# Patient Record
Sex: FEMALE | Race: WHITE | Employment: OTHER | URBAN - NONMETROPOLITAN AREA
[De-identification: names, ages, dates, MRNs, and addresses within clinical notes are randomized per-mention and may not be internally consistent; named-entity substitution may affect disease eponyms.]

---

## 2019-07-16 RX ORDER — LOSARTAN POTASSIUM 100 MG/1
100 TABLET ORAL DAILY
Qty: 90 TABLET | Refills: 1 | Status: SHIPPED | OUTPATIENT
Start: 2019-07-16 | End: 2019-08-29 | Stop reason: SDUPTHER

## 2019-08-29 ENCOUNTER — TELEPHONE (OUTPATIENT)
Dept: FAMILY MEDICINE CLINIC | Age: 84
End: 2019-08-29

## 2019-08-29 ENCOUNTER — HOSPITAL ENCOUNTER (OUTPATIENT)
Age: 84
Discharge: HOME | End: 2019-08-31
Payer: MEDICARE

## 2019-08-29 ENCOUNTER — OFFICE VISIT (OUTPATIENT)
Dept: FAMILY MEDICINE CLINIC | Age: 84
End: 2019-08-29
Payer: MEDICARE

## 2019-08-29 VITALS
SYSTOLIC BLOOD PRESSURE: 138 MMHG | HEIGHT: 62 IN | OXYGEN SATURATION: 97 % | HEART RATE: 90 BPM | WEIGHT: 162 LBS | DIASTOLIC BLOOD PRESSURE: 78 MMHG | BODY MASS INDEX: 29.81 KG/M2

## 2019-08-29 DIAGNOSIS — E78.5 HYPERLIPIDEMIA, UNSPECIFIED HYPERLIPIDEMIA TYPE: ICD-10-CM

## 2019-08-29 DIAGNOSIS — I10 ESSENTIAL HYPERTENSION: ICD-10-CM

## 2019-08-29 DIAGNOSIS — N39.46 MIXED STRESS AND URGE URINARY INCONTINENCE: ICD-10-CM

## 2019-08-29 DIAGNOSIS — C67.9 MALIGNANT NEOPLASM OF URINARY BLADDER, UNSPECIFIED SITE (HCC): ICD-10-CM

## 2019-08-29 DIAGNOSIS — E78.5 HYPERLIPIDEMIA, UNSPECIFIED HYPERLIPIDEMIA TYPE: Primary | ICD-10-CM

## 2019-08-29 LAB
ALBUMIN SERPL-MCNC: 4.1 G/DL (ref 3.5–5.2)
ALP BLD-CCNC: 83 U/L (ref 35–104)
ALT SERPL-CCNC: 14 U/L (ref 0–32)
ANION GAP SERPL CALCULATED.3IONS-SCNC: 11 MMOL/L (ref 7–16)
AST SERPL-CCNC: 22 U/L (ref 0–31)
BACTERIA: ABNORMAL /HPF
BASOPHILS ABSOLUTE: 0.02 E9/L (ref 0–0.2)
BASOPHILS RELATIVE PERCENT: 0.4 % (ref 0–2)
BILIRUB SERPL-MCNC: 0.8 MG/DL (ref 0–1.2)
BILIRUBIN URINE: NEGATIVE
BLOOD, URINE: NEGATIVE
BUN BLDV-MCNC: 16 MG/DL (ref 8–23)
CALCIUM SERPL-MCNC: 9.3 MG/DL (ref 8.6–10.2)
CHLORIDE BLD-SCNC: 105 MMOL/L (ref 98–107)
CHOLESTEROL, TOTAL: 174 MG/DL (ref 0–199)
CLARITY: CLEAR
CO2: 26 MMOL/L (ref 22–29)
COLOR: YELLOW
CREAT SERPL-MCNC: 1 MG/DL (ref 0.5–1)
EOSINOPHILS ABSOLUTE: 0.16 E9/L (ref 0.05–0.5)
EOSINOPHILS RELATIVE PERCENT: 2.9 % (ref 0–6)
EPITHELIAL CELLS, UA: ABNORMAL /HPF
GFR AFRICAN AMERICAN: >60
GFR NON-AFRICAN AMERICAN: 52 ML/MIN/1.73
GLUCOSE BLD-MCNC: 101 MG/DL (ref 74–99)
GLUCOSE URINE: NEGATIVE MG/DL
HCT VFR BLD CALC: 42.4 % (ref 34–48)
HDLC SERPL-MCNC: 45 MG/DL
HEMOGLOBIN: 12.9 G/DL (ref 11.5–15.5)
IMMATURE GRANULOCYTES #: 0.01 E9/L
IMMATURE GRANULOCYTES %: 0.2 % (ref 0–5)
KETONES, URINE: NEGATIVE MG/DL
LDL CHOLESTEROL CALCULATED: 107 MG/DL (ref 0–99)
LEUKOCYTE ESTERASE, URINE: ABNORMAL
LYMPHOCYTES ABSOLUTE: 1.99 E9/L (ref 1.5–4)
LYMPHOCYTES RELATIVE PERCENT: 35.5 % (ref 20–42)
MCH RBC QN AUTO: 27.6 PG (ref 26–35)
MCHC RBC AUTO-ENTMCNC: 30.4 % (ref 32–34.5)
MCV RBC AUTO: 90.6 FL (ref 80–99.9)
MONOCYTES ABSOLUTE: 0.53 E9/L (ref 0.1–0.95)
MONOCYTES RELATIVE PERCENT: 9.5 % (ref 2–12)
NEUTROPHILS ABSOLUTE: 2.89 E9/L (ref 1.8–7.3)
NEUTROPHILS RELATIVE PERCENT: 51.5 % (ref 43–80)
NITRITE, URINE: NEGATIVE
PDW BLD-RTO: 13.8 FL (ref 11.5–15)
PH UA: 5.5 (ref 5–9)
PLATELET # BLD: 209 E9/L (ref 130–450)
PMV BLD AUTO: 11 FL (ref 7–12)
POTASSIUM SERPL-SCNC: 4.2 MMOL/L (ref 3.5–5)
PROTEIN UA: NEGATIVE MG/DL
RBC # BLD: 4.68 E12/L (ref 3.5–5.5)
RBC UA: ABNORMAL /HPF (ref 0–2)
SODIUM BLD-SCNC: 142 MMOL/L (ref 132–146)
SPECIFIC GRAVITY UA: 1.02 (ref 1–1.03)
TOTAL PROTEIN: 7.1 G/DL (ref 6.4–8.3)
TRIGL SERPL-MCNC: 109 MG/DL (ref 0–149)
UROBILINOGEN, URINE: 0.2 E.U./DL
VLDLC SERPL CALC-MCNC: 22 MG/DL
WBC # BLD: 5.6 E9/L (ref 4.5–11.5)
WBC UA: ABNORMAL /HPF (ref 0–5)

## 2019-08-29 PROCEDURE — 36415 COLL VENOUS BLD VENIPUNCTURE: CPT

## 2019-08-29 PROCEDURE — 80053 COMPREHEN METABOLIC PANEL: CPT

## 2019-08-29 PROCEDURE — 99214 OFFICE O/P EST MOD 30 MIN: CPT | Performed by: INTERNAL MEDICINE

## 2019-08-29 PROCEDURE — 80061 LIPID PANEL: CPT

## 2019-08-29 PROCEDURE — 85025 COMPLETE CBC W/AUTO DIFF WBC: CPT

## 2019-08-29 PROCEDURE — 81001 URINALYSIS AUTO W/SCOPE: CPT

## 2019-08-29 RX ORDER — ATORVASTATIN CALCIUM 20 MG/1
TABLET, FILM COATED ORAL
Refills: 1 | COMMUNITY
Start: 2019-08-26 | End: 2019-08-29 | Stop reason: SDUPTHER

## 2019-08-29 RX ORDER — LANOLIN ALCOHOL/MO/W.PET/CERES
CREAM (GRAM) TOPICAL
COMMUNITY

## 2019-08-29 RX ORDER — MAGNESIUM OXIDE 400 MG/1
TABLET ORAL
COMMUNITY
Start: 2010-09-09

## 2019-08-29 RX ORDER — ELECTROLYTES/DEXTROSE
SOLUTION, ORAL ORAL
COMMUNITY
Start: 2010-02-11 | End: 2021-02-11 | Stop reason: ALTCHOICE

## 2019-08-29 RX ORDER — ATORVASTATIN CALCIUM 20 MG/1
TABLET, FILM COATED ORAL
Qty: 45 TABLET | Refills: 1 | Status: SHIPPED | OUTPATIENT
Start: 2019-08-29 | End: 2019-12-18 | Stop reason: SDUPTHER

## 2019-08-29 RX ORDER — LOSARTAN POTASSIUM 100 MG/1
100 TABLET ORAL DAILY
Qty: 90 TABLET | Refills: 1 | Status: SHIPPED | OUTPATIENT
Start: 2019-08-29 | End: 2020-01-15 | Stop reason: SDUPTHER

## 2019-08-29 RX ORDER — CHLORAL HYDRATE 500 MG
CAPSULE ORAL
COMMUNITY
Start: 2010-02-11 | End: 2020-07-27 | Stop reason: ALTCHOICE

## 2019-08-29 ASSESSMENT — PATIENT HEALTH QUESTIONNAIRE - PHQ9
SUM OF ALL RESPONSES TO PHQ QUESTIONS 1-9: 0
1. LITTLE INTEREST OR PLEASURE IN DOING THINGS: 0
SUM OF ALL RESPONSES TO PHQ9 QUESTIONS 1 & 2: 0
SUM OF ALL RESPONSES TO PHQ QUESTIONS 1-9: 0
2. FEELING DOWN, DEPRESSED OR HOPELESS: 0

## 2019-09-04 ENCOUNTER — OFFICE VISIT (OUTPATIENT)
Dept: FAMILY MEDICINE CLINIC | Age: 84
End: 2019-09-04
Payer: MEDICARE

## 2019-09-04 VITALS
HEIGHT: 63 IN | WEIGHT: 161 LBS | DIASTOLIC BLOOD PRESSURE: 70 MMHG | OXYGEN SATURATION: 96 % | SYSTOLIC BLOOD PRESSURE: 132 MMHG | TEMPERATURE: 98.9 F | BODY MASS INDEX: 28.53 KG/M2 | HEART RATE: 97 BPM

## 2019-09-04 DIAGNOSIS — J40 BRONCHITIS: Primary | ICD-10-CM

## 2019-09-04 PROCEDURE — 99213 OFFICE O/P EST LOW 20 MIN: CPT | Performed by: PHYSICIAN ASSISTANT

## 2019-09-04 RX ORDER — CEFDINIR 300 MG/1
300 CAPSULE ORAL 2 TIMES DAILY
Qty: 20 CAPSULE | Refills: 0 | Status: SHIPPED | OUTPATIENT
Start: 2019-09-04 | End: 2019-09-14

## 2019-09-04 NOTE — PROGRESS NOTES
2019   [facility] [address]  [facility] [id number]  [phone]    Lukas De Paz  : 1932  Age: 80 y.o. Sex: female      Subjective:  Chief Complaint   Patient presents with    Cough    Congestion       HPI: The patient states cough and congestion that started 3 days ago. Cough is productive of discolored sputum. Denies any difficulty breathing. Admits to slight headache. Pt has not been taking OTC medications. Denies fever, chills. Denies chest pain or shortness of breath. No vomiting or diarrhea. Eating and drinking without difficulty. The patient presents for evaluation. ROS:  Positive and pertinent negatives as per HPI. All other systems are reviewed and negative. Current Outpatient Medications:     cefdinir (OMNICEF) 300 MG capsule, Take 1 capsule by mouth 2 times daily for 10 days, Disp: 20 capsule, Rfl: 0    vitamin D (CHOLECALCIFEROL) 1000 UNIT TABS tablet, Take by mouth, Disp: , Rfl:     vitamin B-12 (CYANOCOBALAMIN) 1000 MCG tablet, Take by mouth, Disp: , Rfl:     Omega-3 Fatty Acids (FISH OIL) 1000 MG CAPS, Take by mouth, Disp: , Rfl:     Multiple Vitamins-Minerals (MULTIVITAMIN ADULT) TABS, Take by mouth, Disp: , Rfl:     magnesium oxide (MAG-OX) 400 MG tablet, Take by mouth, Disp: , Rfl:     losartan (COZAAR) 100 MG tablet, Take 1 tablet by mouth daily, Disp: 90 tablet, Rfl: 1    atorvastatin (LIPITOR) 20 MG tablet, TAKE 1 TABLET BY MOUTH EVERY OTHER DAY, Disp: 45 tablet, Rfl: 1   Allergies   Allergen Reactions    Acetaminophen     Oxycodone-Acetaminophen Itching        Objective:  Vitals:    19 1143   BP: 132/70   Pulse: 97   Temp: 98.9 °F (37.2 °C)   TempSrc: Temporal   SpO2: 96%   Weight: 161 lb (73 kg)   Height: 5' 3\" (1.6 m)        Exam:  Const: Appears healthy and well developed. No signs of acute distress present. Vitals reviewed per triage. Head/Face: Normocephalic, atraumatic. Facies is symmetric. Eyes: PERRL.   ENMT: Tympanic

## 2019-12-17 DIAGNOSIS — E78.5 HYPERLIPIDEMIA, UNSPECIFIED HYPERLIPIDEMIA TYPE: ICD-10-CM

## 2019-12-18 RX ORDER — ATORVASTATIN CALCIUM 20 MG/1
TABLET, FILM COATED ORAL
Qty: 45 TABLET | Refills: 1 | Status: SHIPPED | OUTPATIENT
Start: 2019-12-18 | End: 2020-01-15 | Stop reason: SDUPTHER

## 2020-01-15 ENCOUNTER — OFFICE VISIT (OUTPATIENT)
Dept: FAMILY MEDICINE CLINIC | Age: 85
End: 2020-01-15
Payer: MEDICARE

## 2020-01-15 VITALS
WEIGHT: 161 LBS | OXYGEN SATURATION: 90 % | HEART RATE: 87 BPM | DIASTOLIC BLOOD PRESSURE: 78 MMHG | BODY MASS INDEX: 28.53 KG/M2 | RESPIRATION RATE: 18 BRPM | SYSTOLIC BLOOD PRESSURE: 138 MMHG | TEMPERATURE: 98.7 F | HEIGHT: 63 IN

## 2020-01-15 PROCEDURE — 99214 OFFICE O/P EST MOD 30 MIN: CPT | Performed by: INTERNAL MEDICINE

## 2020-01-15 RX ORDER — ATORVASTATIN CALCIUM 20 MG/1
TABLET, FILM COATED ORAL
Qty: 45 TABLET | Refills: 1 | Status: SHIPPED
Start: 2020-01-15 | End: 2020-07-27 | Stop reason: SDUPTHER

## 2020-01-15 RX ORDER — AMLODIPINE BESYLATE 5 MG/1
5 TABLET ORAL DAILY
Qty: 90 TABLET | Refills: 1 | Status: SHIPPED
Start: 2020-01-15 | End: 2020-07-27 | Stop reason: SDUPTHER

## 2020-01-15 RX ORDER — LOSARTAN POTASSIUM 100 MG/1
100 TABLET ORAL DAILY
Qty: 90 TABLET | Refills: 1 | Status: SHIPPED
Start: 2020-01-15 | End: 2020-07-27 | Stop reason: SDUPTHER

## 2020-01-15 ASSESSMENT — PATIENT HEALTH QUESTIONNAIRE - PHQ9
SUM OF ALL RESPONSES TO PHQ QUESTIONS 1-9: 0
SUM OF ALL RESPONSES TO PHQ QUESTIONS 1-9: 0
1. LITTLE INTEREST OR PLEASURE IN DOING THINGS: 0
2. FEELING DOWN, DEPRESSED OR HOPELESS: 0
SUM OF ALL RESPONSES TO PHQ9 QUESTIONS 1 & 2: 0

## 2020-01-16 NOTE — PROGRESS NOTES
[address] [facility]     1/15/20  Polo Glass : 1932 Sex: female  Age: 80 y.o. Chief Complaint   Patient presents with    3 Month Follow-Up       HPI    Patient presents today for 3-month follow-up visit on her medical problems. She brings in blood pressure numbers today but only a few. They all look high. Couple of them in the 596 systolic range. Nothing less than 137. I told her going to have to add something to her losartan as this is very poorly controlled still. Does have upcoming Botox injection through urology for her incontinence at the end of the month. Follows with them and ophthalmology. She is tolerating her medications okay. Lipids have been stable. Also her bladder cancer also with urology. Review of Systems     Const: Denies chills, fever and sweats. ENMT: Denies ear symptoms. Reports postnasal drip, but denies other nasal symptoms. Denies mouth or throat  symptoms. CV: Denies chest pain, orthopnea and palpitations. Resp: Denies cough, SOB and wheezing. GI: Denies diarrhea, nausea and vomiting. : Urinary: reports incontinence, but denies dysuria, frequency and frequent UTI's--she has not been following with  urology Recently. She has had InterStim placement and Botox treatment in the past. She also has history of bladder  cancer. Musculo: Reports arthritis. Neuro: Reports difficulty with balance but denies dizziness, headache, seizures and syncope/Improved/no longer  following with neurology  Psych: Reports anxiety, depression and stress-stable-wishing no further medication. REST OF PERTINENT ROS GONE OVER AND WAS NEGATIVE.    PMH:  Problem List: Essential hypertension, Benign essential hypertension, Taking medication, Hypothyroidism  Health Maintenance:  Couseled on Home Safety - (2018)  Mammogram - (2011)  Mammogram Screening - (2007)  Colonoscopy - (2010)  Bone Density Test Screening - (2008)  Pelvic/Pap Exam - with  Cambi  Rectal Exam - 5/10  Bone Density Scan - ,,,-nl  Duplex Carotid Ultasound - ,3/09  2D ECHO -   EKG - ,,,10/14,3/16,  Hemmocult Cards - 7/15-neg x 3  Holter monitor - 3/16  Prevnar Vaccine - (2015)  Pneumonia Vaccination - ()  Tetanus Immunization - ()  Zoster/Shingles Vaccine - () [facility]  Influenza Vaccination - (2016)  Medical Problems:  Hypertension - . Hypothyroidism - . Incontinence - .bladder interstim/botox  transient ischemic attack - (2007) . Abdominal Hernia Left - followed by Dr. Yony Salgado  Dysphagia - . Tinnitus - .  Varicose Veins - tx with sclerotherapy  Arthritis - shoulders; injected by Dr Sorto Shoulder - follows with urology  reflex sympathetic dystrophy  Pulmonary Nodule - followed and stable  Sleep Apnea  Hyperlipidemia - statin intolerance  Anxiety, Depression, Osteoarthritis, LLQ spigelian hernia repair, Hiatal Hernia, Lumbar DDD, Lumbar Spinal Stenosis,  Diverticulosis  Valvular Heart Disease - mild  InterStim placement - 3/16  Macular Degeneration  Left carpal tunnel surgery -   Surgical Hx:  Bladder Repair - X4  Ovarian Cyst - . Oophorectomy- - . Hysterectomy, Partial - () . Tonsillectomy W/ Adenoidectomy - () (age 10 Years) . Endoscopy - (2006) .   Cataract Removal - bilateral  carpal tunnel release - remotely, right hand  carpal tunnel release, left - (2018) Dr. Cardona Bath: Sheba Kelly, for reading  OB/Gyn Hx:: (3)Parity: Full term (3)  Reviewed, no changes.             Current Outpatient Medications:     Turmeric (CURCUMIN 95 PO), Take by mouth, Disp: , Rfl:     atorvastatin (LIPITOR) 20 MG tablet, TAKE 1 TABLET BY MOUTH EVERY OTHER DAY, Disp: 45 tablet, Rfl: 1    losartan (COZAAR) 100 MG tablet, Take 1 tablet by mouth daily, Disp: 90 tablet, Rfl: 1    vitamin D (CHOLECALCIFEROL) 1000 UNIT TABS tablet, Take by mouth, Disp: , Rfl:     vitamin B-12 (CYANOCOBALAMIN) 1000 MCG tablet, Take by mouth, Disp: , Rfl:     Omega-3 Fatty Acids (FISH OIL) 1000 MG CAPS, Take by mouth, Disp: , Rfl:     Multiple Vitamins-Minerals (MULTIVITAMIN ADULT) TABS, Take by mouth, Disp: , Rfl:     magnesium oxide (MAG-OX) 400 MG tablet, Take by mouth, Disp: , Rfl:   No Known Allergies    Past Medical History:   Diagnosis Date    Abdominal hernia     SANKOVIC    Anxiety     Arthritis     shoulders; injected by dr Marylene Ruddy    Bladder [facility])     DDD (degenerative disc disease), cervical     lumbar    Depression     Diverticulosis     Dysphagia     Hiatal hernia     Hyperlipidemia     Hypertension     Hypothyroidism     Incontinence     Macular degeneration     Pulmonary nodule     Reflex sympathetic dystrophy     Sleep apnea     Spigelian hernia     Spinal stenosis     lumbar    Tinnitus     Transient ischemic attack 01/2007    Valvular heart disease      Past Surgical History:   Procedure Laterality Date    BLADDER REPAIR      x4    CARPAL TUNNEL RELEASE Right     CARPAL TUNNEL RELEASE Left 02/2018    kylah    CATARACT REMOVAL Bilateral     OVARY REMOVAL      PARTIAL HYSTERECTOMY  1977    TONSILLECTOMY  1941     Family History   Problem Relation Age of Onset    Stroke Mother     Prostate Cancer Father     Lung Cancer Sister     Prostate Cancer Brother     Coronary Art Dis Brother     Pancreatic Cancer Brother     Breast Cancer Maternal Aunt     Cancer Paternal Aunt         ovarian, stomach    Cancer Paternal Uncle     Stroke Maternal Grandmother     Other Son         neuroendocine malignancy    Other Daughter         neuroendocine malignancy     Social History     Socioeconomic History    Marital status:      Spouse name: Not on file    Number of children: Not on file    Years of education: Not on file    Highest education level: Not on file   Occupational History    Not on file   Social Needs    Financial resource strain: Not on file   Viki-Vicky insecurity:     Worry: Not on file     Inability: Not on file    Transportation needs:     Medical: Not on file     Non-medical: Not on file   Tobacco Use    Smoking status: Never Smoker    Smokeless tobacco: Never Used   Substance and Sexual Activity    Alcohol use: Yes     Comment: occ    Drug use: Not on file    Sexual activity: Not on file   Lifestyle    Physical activity:     Days per week: Not on file     Minutes per session: Not on file    Stress: Not on file   Relationships    Social connections:     Talks on phone: Not on file     Gets together: Not on file     Attends Jehovah's witness service: Not on file     Active member of club or organization: Not on file     Attends meetings of clubs or organizations: Not on file     Relationship status: Not on file    Intimate partner violence:     Fear of current or ex partner: Not on file     Emotionally abused: Not on file     Physically abused: Not on file     Forced sexual activity: Not on file   Other Topics Concern    Not on file   Social History Narrative    Not on file       Vitals:    01/15/20 1525 01/15/20 1531   BP: (!) 142/78 138/78   Pulse: 87    Resp: 18    Temp: 98.7 °F (37.1 °C)    TempSrc: Temporal    SpO2: 90%    Weight: 161 lb (73 kg)    Height: 5' 3\" (1.6 m)        Physical Exam    Const: Appears well developed and well nourished. No signs of acute distress present. Neck: Supple and symmetric. Palpation reveals no adenopathy. No masses appreciated. Thyroid exhibits no nodule  or thyromegaly. No JVD. Carotids: 2+ and equal bilaterally, without bruits. Resp: No rales, rhonchi or wheezes appreciated over the lungs bilaterally. CV: Rate is regular. Rhythm is regular/occasional ectopy. S1 is normal. S2 is normal. No gallop or rubs. No heart  murmur appreciated. Extremities: Edema, but no clubbing or cyanosis/trace  Abdomen: Bowel sounds are normoactive. Palpation reveals softness, with no distension, organomegaly or  tenderness.  No abdominal masses palpable. No palpable hepatosplenomegaly. Musculo: Walks with a normal gait I don't see any ataxia at this time. Upper Extremities: Limitation with movement of  the upper extremities bilaterally. Lower Extremities: Full ROM bilaterally. Psych: Patient's attitude is cooperative. Patient's affect is appropriate. Judgement is realistic. Insight is appropriate. Neurologically grossly intact without focal deficits noted. Assessment and Plan:  Yoly Martin was seen today for 3 month follow-up. Diagnoses and all orders for this visit:    Mixed stress and urge urinary incontinence    Hyperlipidemia, unspecified hyperlipidemia type  -     atorvastatin (LIPITOR) 20 MG tablet; TAKE 1 TABLET BY MOUTH EVERY OTHER DAY  -     Lipid Panel; Future    Essential hypertension  -     losartan (COZAAR) 100 MG tablet; Take 1 tablet by mouth daily  -     CBC Auto Differential; Future  -     Comprehensive Metabolic Panel; Future  -     MAGNESIUM; Future    Malignant neoplasm of urinary bladder, unspecified site [facility])      Plan: Blood work to monitor disease progression and medication use. This will be accomplished in 2 weeks. Follow-up with urology and ophthalmology. Amlodipine 5 mg to her antihypertensive regimen. Monitor blood pressure closely. Call in numbers in 2 to 3 weeks with an update. Other orders will be pending above. I will see her back in 3 months and as needed. Return in about 3 months (around 4/15/2020). Seen By:  Madi Mcnamara MD      *Document was created using voice recognition software. Note was reviewed however may contain grammatical errors.

## 2020-01-23 ENCOUNTER — TELEPHONE (OUTPATIENT)
Dept: FAMILY MEDICINE CLINIC | Age: 85
End: 2020-01-23

## 2020-01-23 ENCOUNTER — HOSPITAL ENCOUNTER (OUTPATIENT)
Age: 85
Discharge: HOME | End: 2020-01-25
Payer: MEDICARE

## 2020-01-23 LAB
ALBUMIN SERPL-MCNC: 4.2 G/DL (ref 3.5–5.2)
ALP BLD-CCNC: 74 U/L (ref 35–104)
ALT SERPL-CCNC: 13 U/L (ref 0–32)
ANION GAP SERPL CALCULATED.3IONS-SCNC: 12 MMOL/L (ref 7–16)
AST SERPL-CCNC: 21 U/L (ref 0–31)
BASOPHILS ABSOLUTE: 0.04 E9/L (ref 0–0.2)
BASOPHILS RELATIVE PERCENT: 0.6 % (ref 0–2)
BILIRUB SERPL-MCNC: 0.9 MG/DL (ref 0–1.2)
BUN BLDV-MCNC: 20 MG/DL (ref 8–23)
CALCIUM SERPL-MCNC: 9.2 MG/DL (ref 8.6–10.2)
CHLORIDE BLD-SCNC: 102 MMOL/L (ref 98–107)
CHOLESTEROL, TOTAL: 133 MG/DL (ref 0–199)
CO2: 25 MMOL/L (ref 22–29)
CREAT SERPL-MCNC: 1 MG/DL (ref 0.5–1)
EOSINOPHILS ABSOLUTE: 0.14 E9/L (ref 0.05–0.5)
EOSINOPHILS RELATIVE PERCENT: 2.1 % (ref 0–6)
GFR AFRICAN AMERICAN: >60
GFR NON-AFRICAN AMERICAN: 52 ML/MIN/1.73
GLUCOSE BLD-MCNC: 124 MG/DL (ref 74–99)
HCT VFR BLD CALC: 42.9 % (ref 34–48)
HDLC SERPL-MCNC: 48 MG/DL
HEMOGLOBIN: 13.1 G/DL (ref 11.5–15.5)
IMMATURE GRANULOCYTES #: 0.03 E9/L
IMMATURE GRANULOCYTES %: 0.5 % (ref 0–5)
LDL CHOLESTEROL CALCULATED: 65 MG/DL (ref 0–99)
LYMPHOCYTES ABSOLUTE: 1.83 E9/L (ref 1.5–4)
LYMPHOCYTES RELATIVE PERCENT: 27.7 % (ref 20–42)
MAGNESIUM: 2.3 MG/DL (ref 1.6–2.6)
MCH RBC QN AUTO: 27.4 PG (ref 26–35)
MCHC RBC AUTO-ENTMCNC: 30.5 % (ref 32–34.5)
MCV RBC AUTO: 89.7 FL (ref 80–99.9)
MONOCYTES ABSOLUTE: 0.63 E9/L (ref 0.1–0.95)
MONOCYTES RELATIVE PERCENT: 9.5 % (ref 2–12)
NEUTROPHILS ABSOLUTE: 3.94 E9/L (ref 1.8–7.3)
NEUTROPHILS RELATIVE PERCENT: 59.6 % (ref 43–80)
PDW BLD-RTO: 13.4 FL (ref 11.5–15)
PLATELET # BLD: 242 E9/L (ref 130–450)
PMV BLD AUTO: 11.4 FL (ref 7–12)
POTASSIUM SERPL-SCNC: 4.9 MMOL/L (ref 3.5–5)
RBC # BLD: 4.78 E12/L (ref 3.5–5.5)
SODIUM BLD-SCNC: 139 MMOL/L (ref 132–146)
TOTAL PROTEIN: 6.8 G/DL (ref 6.4–8.3)
TRIGL SERPL-MCNC: 101 MG/DL (ref 0–149)
VLDLC SERPL CALC-MCNC: 20 MG/DL
WBC # BLD: 6.6 E9/L (ref 4.5–11.5)

## 2020-01-23 PROCEDURE — 80061 LIPID PANEL: CPT

## 2020-01-23 PROCEDURE — 80053 COMPREHEN METABOLIC PANEL: CPT

## 2020-01-23 PROCEDURE — 36415 COLL VENOUS BLD VENIPUNCTURE: CPT

## 2020-01-23 PROCEDURE — 85025 COMPLETE CBC W/AUTO DIFF WBC: CPT

## 2020-01-23 PROCEDURE — 83735 ASSAY OF MAGNESIUM: CPT

## 2020-04-20 ENCOUNTER — VIRTUAL VISIT (OUTPATIENT)
Dept: PRIMARY CARE CLINIC | Age: 85
End: 2020-04-20
Payer: MEDICARE

## 2020-04-20 VITALS
BODY MASS INDEX: 28.16 KG/M2 | HEIGHT: 62 IN | SYSTOLIC BLOOD PRESSURE: 133 MMHG | DIASTOLIC BLOOD PRESSURE: 74 MMHG | WEIGHT: 153 LBS

## 2020-04-20 PROCEDURE — G2012 BRIEF CHECK IN BY MD/QHP: HCPCS | Performed by: INTERNAL MEDICINE

## 2020-04-20 NOTE — PROGRESS NOTES
stenosis     lumbar    Tinnitus     Transient ischemic attack 01/2007    Valvular heart disease      Past Surgical History:   Procedure Laterality Date    BLADDER REPAIR      x4    CARPAL TUNNEL RELEASE Right     CARPAL TUNNEL RELEASE Left 02/2018    kylah    CATARACT REMOVAL Bilateral     OVARY REMOVAL      PARTIAL HYSTERECTOMY  1977    TONSILLECTOMY  1941     Family History   Problem Relation Age of Onset    Stroke Mother     Prostate Cancer Father     Lung Cancer Sister     Prostate Cancer Brother     Coronary Art Dis Brother     Pancreatic Cancer Brother     Breast Cancer Maternal Aunt     Cancer Paternal Aunt         ovarian, stomach    Cancer Paternal Uncle     Stroke Maternal Grandmother     Other Son         neuroendocine malignancy    Other Daughter         neuroendocine malignancy     Social History     Socioeconomic History    Marital status:      Spouse name: Not on file    Number of children: Not on file    Years of education: Not on file    Highest education level: Not on file   Occupational History    Not on file   Social Needs    Financial resource strain: Not on file    Food insecurity     Worry: Not on file     Inability: Not on file    Transportation needs     Medical: Not on file     Non-medical: Not on file   Tobacco Use    Smoking status: Never Smoker    Smokeless tobacco: Never Used   Substance and Sexual Activity    Alcohol use: Yes     Comment: occ    Drug use: Not on file    Sexual activity: Not on file   Lifestyle    Physical activity     Days per week: Not on file     Minutes per session: Not on file    Stress: Not on file   Relationships    Social connections     Talks on phone: Not on file     Gets together: Not on file     Attends Zoroastrianism service: Not on file     Active member of club or organization: Not on file     Attends meetings of clubs or organizations: Not on file     Relationship status: Not on file    Intimate partner violence

## 2020-07-27 ENCOUNTER — OFFICE VISIT (OUTPATIENT)
Dept: FAMILY MEDICINE CLINIC | Age: 85
End: 2020-07-27
Payer: MEDICARE

## 2020-07-27 VITALS
BODY MASS INDEX: 27.86 KG/M2 | HEART RATE: 88 BPM | HEIGHT: 62 IN | SYSTOLIC BLOOD PRESSURE: 124 MMHG | DIASTOLIC BLOOD PRESSURE: 82 MMHG | WEIGHT: 151.4 LBS | OXYGEN SATURATION: 98 % | TEMPERATURE: 97.8 F

## 2020-07-27 PROCEDURE — 99214 OFFICE O/P EST MOD 30 MIN: CPT | Performed by: INTERNAL MEDICINE

## 2020-07-27 RX ORDER — LOSARTAN POTASSIUM 100 MG/1
100 TABLET ORAL DAILY
Qty: 90 TABLET | Refills: 1 | Status: SHIPPED
Start: 2020-07-27 | End: 2020-11-11 | Stop reason: SDUPTHER

## 2020-07-27 RX ORDER — ATORVASTATIN CALCIUM 20 MG/1
TABLET, FILM COATED ORAL
Qty: 45 TABLET | Refills: 1 | Status: SHIPPED
Start: 2020-07-27 | End: 2020-11-11 | Stop reason: SDUPTHER

## 2020-07-27 RX ORDER — AMLODIPINE BESYLATE 5 MG/1
5 TABLET ORAL DAILY
Qty: 90 TABLET | Refills: 1 | Status: SHIPPED
Start: 2020-07-27 | End: 2020-11-11 | Stop reason: SDUPTHER

## 2020-07-27 NOTE — PROGRESS NOTES
[address] [facility] [address] [facility]     20  Pako Martinez : 1932 Sex: female  Age: 80 y.o. Chief Complaint   Patient presents with    Hyperlipidemia       HPI    Patient presents today for 3-month follow-up visit on her medical problems. Patient states she has not been checking blood pressure very often but when she does check it is been in good range. Number today on recheck was okay. I did ask her to start checking at least once or twice a week and documenting. Her lipids have been in a good range on her statin medication. She is tolerating this well. She is receiving Botox from urology for her urinary symptoms. She is to see them upcoming again soon. She denies any falls. States she been somewhat fatigued recently as she and her  are in the process of moving. States she does not sleep very well. We did discuss melatonin as an option patient states that she has lost about 10 pounds with the process of his recent move. States her appetite has been good. .  She does continue to follow with urology for her bladder cancer and Botox injections for urinary incontinence. Also follows with ophthalmology for her macular degeneration. Review of Systems     Const: Denies chills, fever and sweats. ENMT: Denies ear symptoms. Reports postnasal drip, but denies other nasal symptoms. Denies mouth or throat  symptoms. CV: Denies chest pain, orthopnea and palpitations. Resp: Denies cough, SOB and wheezing. GI: Denies diarrhea, nausea and vomiting. : Urinary: reports incontinence, but denies dysuria, frequency and frequent UTI's--he is following with urology. She has had InterStim placement and Botox treatment in the past. She also has history of bladder  cancer. Musculo: Reports arthritis. Neuro: Reports difficulty with balance but denies dizziness, headache, seizures and syncope/Improved/no longer  following with neurology-symptoms have improved.   Psych: Reports anxiety, depression and stress-stable-wishing no further medication.          REST OF PERTINENT ROS GONE OVER AND WAS NEGATIVE. PMH:  Problem List: Essential hypertension, Benign essential hypertension, Taking medication, Hypothyroidism  Health Maintenance:  Couseled on Home Safety - (2018)  Mammogram - (2011)  Mammogram Screening - (2007)  Colonoscopy - (2010)  Bone Density Test Screening - (2008)  Pelvic/Pap Exam - with Dr. Daisey Mortimer  Rectal Exam - 5/10  Bone Density Scan - ,,,-nl  Duplex Carotid Ultasound - ,3/09  2D ECHO -   EKG - ,,,10/14,3/16,  Hemmocult Cards - 7/15-neg x 3  Holter monitor - 3/16  Prevnar Vaccine - (2015)  Pneumonia Vaccination - ()  Tetanus Immunization - ()  Zoster/Shingles Vaccine - () [facility]  Influenza Vaccination - (2016)  Medical Problems:  Hypertension - . Hypothyroidism - . Incontinence - .bladder interstim/botox  transient ischemic attack - (2007) . Abdominal Hernia Left - followed by Dr. Andie Alford  Dysphagia - . Tinnitus - .  Varicose Veins - tx with sclerotherapy  Arthritis - shoulders; injected by Dr Nasir Gonzales - follows with urology  reflex sympathetic dystrophy  Pulmonary Nodule - followed and stable  Sleep Apnea  Hyperlipidemia - statin intolerance  Anxiety, Depression, Osteoarthritis, LLQ spigelian hernia repair, Hiatal Hernia, Lumbar DDD, Lumbar Spinal Stenosis,  Diverticulosis  Valvular Heart Disease - mild  InterStim placement - 3/16  Macular Degeneration  Left carpal tunnel surgery -   Surgical Hx:  Bladder Repair - X4  Ovarian Cyst - . Oophorectomy- - . Hysterectomy, Partial - () . Tonsillectomy W/ Adenoidectomy - (1941) (age 10 Years) . Endoscopy - (2006) .   Cataract Removal - bilateral  carpal tunnel release - remotely, right hand  carpal tunnel release, left - (2018) Dr. Boris Olvera: Ezio Young, for reading  OB/Gyn Hx:: (3)Parity: Full term (3)  Reviewed, no changes.             Current Outpatient Medications:     amLODIPine (NORVASC) 5 MG tablet, Take 1 tablet by mouth daily, Disp: 90 tablet, Rfl: 1    atorvastatin (LIPITOR) 20 MG tablet, TAKE 1 TABLET BY MOUTH EVERY OTHER DAY, Disp: 45 tablet, Rfl: 1    losartan (COZAAR) 100 MG tablet, Take 1 tablet by mouth daily, Disp: 90 tablet, Rfl: 1    Turmeric (CURCUMIN 95 PO), Take by mouth, Disp: , Rfl:     vitamin D (CHOLECALCIFEROL) 1000 UNIT TABS tablet, Take by mouth, Disp: , Rfl:     vitamin B-12 (CYANOCOBALAMIN) 1000 MCG tablet, Take by mouth, Disp: , Rfl:     Multiple Vitamins-Minerals (MULTIVITAMIN ADULT) TABS, Take by mouth, Disp: , Rfl:     magnesium oxide (MAG-OX) 400 MG tablet, Take by mouth, Disp: , Rfl:   No Known Allergies    Past Medical History:   Diagnosis Date    Abdominal hernia     SANKOVIC    Anxiety     Arthritis     shoulders; injected by dr Lara Mazariegos    Bladder cancer [facility])     DDD (degenerative disc disease), cervical     lumbar    Depression     Diverticulosis     Dysphagia     Hiatal hernia     Hyperlipidemia     Hypertension     Hypothyroidism     Incontinence     Macular degeneration     Pulmonary nodule     Reflex sympathetic dystrophy     Sleep apnea     Spigelian hernia     Spinal stenosis     lumbar    Tinnitus     Transient ischemic attack 01/2007    Valvular heart disease      Past Surgical History:   Procedure Laterality Date    BLADDER REPAIR      x4    CARPAL TUNNEL RELEASE Right     CARPAL TUNNEL RELEASE Left 02/2018    kylah    CATARACT REMOVAL Bilateral     OVARY REMOVAL      PARTIAL HYSTERECTOMY  1977    TONSILLECTOMY  1941     Family History   Problem Relation Age of Onset    Stroke Mother     Prostate Cancer Father     Lung Cancer Sister     Prostate Cancer Brother     Coronary Art Dis Brother     Pancreatic Cancer Brother     Breast Cancer Maternal Aunt     Cancer Paternal Aunt         ovarian, stomach    Cancer Paternal Ayala Paula Stroke Maternal Grandmother     Other Son         neuroendocine malignancy    Other Daughter         neuroendocine malignancy     Social History     Socioeconomic History    Marital status:      Spouse name: Not on file    Number of children: Not on file    Years of education: Not on file    Highest education level: Not on file   Occupational History    Not on file   Social Needs    Financial resource strain: Not on file    Food insecurity     Worry: Not on file     Inability: Not on file    Transportation needs     Medical: Not on file     Non-medical: Not on file   Tobacco Use    Smoking status: Never Smoker    Smokeless tobacco: Never Used   Substance and Sexual Activity    Alcohol use: Yes     Comment: occ    Drug use: Not on file    Sexual activity: Not on file   Lifestyle    Physical activity     Days per week: Not on file     Minutes per session: Not on file    Stress: Not on file   Relationships    Social connections     Talks on phone: Not on file     Gets together: Not on file     Attends Voodoo service: Not on file     Active member of club or organization: Not on file     Attends meetings of clubs or organizations: Not on file     Relationship status: Not on file    Intimate partner violence     Fear of current or ex partner: Not on file     Emotionally abused: Not on file     Physically abused: Not on file     Forced sexual activity: Not on file   Other Topics Concern    Not on file   Social History Narrative    Not on file       Vitals:    07/27/20 1003   BP: 124/82   Pulse: 88   Temp: 97.8 °F (36.6 °C)   TempSrc: Temporal   SpO2: 98%   Weight: 151 lb 6.4 oz (68.7 kg)   Height: 5' 2\" (1.575 m)       Physical Exam    Const: Appears well developed and well nourished. No signs of acute distress present. Neck: Supple and symmetric. Palpation reveals no adenopathy. No masses appreciated. Thyroid exhibits no nodule  or thyromegaly. No JVD.  Carotids: 2+ and equal bilaterally, without bruits. Resp: No rales, rhonchi or wheezes appreciated over the lungs bilaterally. CV: Rate is regular. Rhythm is regular/occasional ectopy. S1 is normal. S2 is normal. No gallop or rubs. No heart  murmur appreciated. Extremities: Edema, but no clubbing or cyanosis/trace  Abdomen: Bowel sounds are normoactive. Palpation reveals softness, with no distension, organomegaly or  tenderness. No abdominal masses palpable. No palpable hepatosplenomegaly. Musculo: Walks with a normal gait I don't see any ataxia at this time. Upper Extremities: Limitation with movement of  the upper extremities bilaterally. Lower Extremities: Full ROM bilaterally. Psych: Patient's attitude is cooperative. Patient's affect is appropriate. Judgement is realistic. Insight is appropriate. Neurologically grossly intact without focal deficits noted.           Assessment and Plan:  Cherry Brown was seen today for hyperlipidemia. Diagnoses and all orders for this visit:    Essential hypertension  -     losartan (COZAAR) 100 MG tablet; Take 1 tablet by mouth daily  -     CBC Auto Differential; Future  -     Comprehensive Metabolic Panel; Future  -     MAGNESIUM; Future  Stable on current medication. 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 Other orders  -     amLODIPine (NORVASC) 5 MG tablet; Take 1 tablet by mouth daily    Depression/anxiety: Stable    Plan: Start monitoring blood pressure more closely. Follow-up with above consultants/urology and ophthalmology. Monitor weight for any further loss. Prescription management performed.   Blood work fasting in 2 weeks to monitor disease progression medication use. Patient is declining any medication for depression or anxiety at this time. No suicidal ideation. No follow-ups on file. Seen By:  Adele Maravilla MD      *Document was created using voice recognition software. Note was reviewed however may contain grammatical errors.

## 2020-07-30 ENCOUNTER — TELEPHONE (OUTPATIENT)
Dept: FAMILY MEDICINE CLINIC | Age: 85
End: 2020-07-30

## 2020-07-30 ENCOUNTER — HOSPITAL ENCOUNTER (OUTPATIENT)
Age: 85
Discharge: HOME | End: 2020-08-01
Payer: MEDICARE

## 2020-07-30 LAB
ALBUMIN SERPL-MCNC: 4 G/DL (ref 3.5–5.2)
ALP BLD-CCNC: 75 U/L (ref 35–104)
ALT SERPL-CCNC: 19 U/L (ref 0–32)
ANION GAP SERPL CALCULATED.3IONS-SCNC: 17 MMOL/L (ref 7–16)
AST SERPL-CCNC: 23 U/L (ref 0–31)
BASOPHILS ABSOLUTE: 0.03 E9/L (ref 0–0.2)
BASOPHILS RELATIVE PERCENT: 0.5 % (ref 0–2)
BILIRUB SERPL-MCNC: 0.7 MG/DL (ref 0–1.2)
BUN BLDV-MCNC: 20 MG/DL (ref 8–23)
CALCIUM SERPL-MCNC: 9.1 MG/DL (ref 8.6–10.2)
CHLORIDE BLD-SCNC: 105 MMOL/L (ref 98–107)
CHOLESTEROL, TOTAL: 161 MG/DL (ref 0–199)
CO2: 23 MMOL/L (ref 22–29)
CREAT SERPL-MCNC: 0.9 MG/DL (ref 0.5–1)
EOSINOPHILS ABSOLUTE: 0.1 E9/L (ref 0.05–0.5)
EOSINOPHILS RELATIVE PERCENT: 1.6 % (ref 0–6)
GFR AFRICAN AMERICAN: >60
GFR NON-AFRICAN AMERICAN: 59 ML/MIN/1.73
GLUCOSE BLD-MCNC: 116 MG/DL (ref 74–99)
HCT VFR BLD CALC: 40.1 % (ref 34–48)
HDLC SERPL-MCNC: 46 MG/DL
HEMOGLOBIN: 12.5 G/DL (ref 11.5–15.5)
IMMATURE GRANULOCYTES #: 0.02 E9/L
IMMATURE GRANULOCYTES %: 0.3 % (ref 0–5)
LDL CHOLESTEROL CALCULATED: 90 MG/DL (ref 0–99)
LYMPHOCYTES ABSOLUTE: 1.49 E9/L (ref 1.5–4)
LYMPHOCYTES RELATIVE PERCENT: 23.5 % (ref 20–42)
MAGNESIUM: 2.4 MG/DL (ref 1.6–2.6)
MCH RBC QN AUTO: 27.9 PG (ref 26–35)
MCHC RBC AUTO-ENTMCNC: 31.2 % (ref 32–34.5)
MCV RBC AUTO: 89.5 FL (ref 80–99.9)
MONOCYTES ABSOLUTE: 0.6 E9/L (ref 0.1–0.95)
MONOCYTES RELATIVE PERCENT: 9.5 % (ref 2–12)
NEUTROPHILS ABSOLUTE: 4.09 E9/L (ref 1.8–7.3)
NEUTROPHILS RELATIVE PERCENT: 64.6 % (ref 43–80)
PDW BLD-RTO: 13.6 FL (ref 11.5–15)
PLATELET # BLD: 232 E9/L (ref 130–450)
PMV BLD AUTO: 11.2 FL (ref 7–12)
POTASSIUM SERPL-SCNC: 4.4 MMOL/L (ref 3.5–5)
RBC # BLD: 4.48 E12/L (ref 3.5–5.5)
SODIUM BLD-SCNC: 145 MMOL/L (ref 132–146)
TOTAL PROTEIN: 6.6 G/DL (ref 6.4–8.3)
TRIGL SERPL-MCNC: 125 MG/DL (ref 0–149)
VITAMIN B-12: 465 PG/ML (ref 211–946)
VITAMIN D 25-HYDROXY: 64 NG/ML (ref 30–100)
VLDLC SERPL CALC-MCNC: 25 MG/DL
WBC # BLD: 6.3 E9/L (ref 4.5–11.5)

## 2020-07-30 PROCEDURE — 82306 VITAMIN D 25 HYDROXY: CPT

## 2020-07-30 PROCEDURE — 36415 COLL VENOUS BLD VENIPUNCTURE: CPT

## 2020-07-30 PROCEDURE — 82607 VITAMIN B-12: CPT

## 2020-07-30 PROCEDURE — 85025 COMPLETE CBC W/AUTO DIFF WBC: CPT

## 2020-07-30 PROCEDURE — 83735 ASSAY OF MAGNESIUM: CPT

## 2020-07-30 PROCEDURE — 80053 COMPREHEN METABOLIC PANEL: CPT

## 2020-07-30 PROCEDURE — 80061 LIPID PANEL: CPT

## 2020-11-11 ENCOUNTER — VIRTUAL VISIT (OUTPATIENT)
Dept: FAMILY MEDICINE CLINIC | Age: 85
End: 2020-11-11
Payer: MEDICARE

## 2020-11-11 DIAGNOSIS — I10 ESSENTIAL HYPERTENSION: ICD-10-CM

## 2020-11-11 PROCEDURE — 99213 OFFICE O/P EST LOW 20 MIN: CPT | Performed by: INTERNAL MEDICINE

## 2020-11-11 RX ORDER — LOSARTAN POTASSIUM 100 MG/1
100 TABLET ORAL DAILY
Qty: 90 TABLET | Refills: 1 | Status: SHIPPED
Start: 2020-11-11 | End: 2021-06-17 | Stop reason: SDUPTHER

## 2020-11-11 RX ORDER — AMLODIPINE BESYLATE 5 MG/1
5 TABLET ORAL DAILY
Qty: 90 TABLET | Refills: 1 | Status: SHIPPED
Start: 2020-11-11 | End: 2021-06-17 | Stop reason: SDUPTHER

## 2020-11-11 RX ORDER — ATORVASTATIN CALCIUM 20 MG/1
TABLET, FILM COATED ORAL
Qty: 45 TABLET | Refills: 1 | Status: SHIPPED
Start: 2020-11-11 | End: 2021-05-25

## 2020-11-11 ASSESSMENT — PATIENT HEALTH QUESTIONNAIRE - PHQ9
SUM OF ALL RESPONSES TO PHQ QUESTIONS 1-9: 0
SUM OF ALL RESPONSES TO PHQ QUESTIONS 1-9: 0
2. FEELING DOWN, DEPRESSED OR HOPELESS: 0
SUM OF ALL RESPONSES TO PHQ9 QUESTIONS 1 & 2: 0
SUM OF ALL RESPONSES TO PHQ QUESTIONS 1-9: 0
1. LITTLE INTEREST OR PLEASURE IN DOING THINGS: 0

## 2020-11-11 NOTE — PROGRESS NOTES
TeleMedicine Video Visit    This visit was performed as a virtual video visit using a synchronous, two-way, audio-video telehealth technology platform. Patient identification was verified at the start of the visit, including the patient's telephone number and physical location. I discussed with the patient the nature of our telehealth visits, that:     1. Due to the nature of an audio- video modality, the only components of a physical exam that could be done are the elements supported by direct observation. 2. I would evaluate the patient and recommend diagnostics and treatments based on my assessment. 3. If it was felt that the patient should be evaluated in clinic or an emergency room setting, then they would be directed there. 4. Our sessions are not being recorded and that personal health information is protected. 5. Our team would provide follow up care in person if/when the patient needs it. Patient does agree to proceed with telemedicine consultation. Patient's location: home address in [facility]  Physician  location Office address in [place] other people involved in call--/son      Time spent: Greater than Not billed by time    This visit was completed virtually using [employer] [facility]     20  Nichole Orona : 1932 Sex: female  Age: 80 y.o. Chief Complaint   Patient presents with    Hypertension    Hyperlipidemia       HPI  Patient encounter today via virtual video visit secondary to ongoing COVID-19 pandemic status. In general Iesha's been doing well. Blood pressures been in good range. Her lipids have been stable on statin medication. She denies any falls. She is up-to-date with urology regarding her bladder cancer and Botox injections. She does continue to follow with urology for her bladder cancer and Botox injections for urinary incontinence.  Also follows with ophthalmology for her macular degeneration.       Review of Systems   Const: Denies chills, fever and sweats. ENMT: Denies ear symptoms. Reports postnasal drip, but denies other nasal symptoms. Denies mouth or throat  symptoms. CV: Denies chest pain, orthopnea and palpitations. Resp: Denies cough, SOB and wheezing. GI: Denies diarrhea, nausea and vomiting. : Urinary: reports incontinence, but denies dysuria, frequency and frequent UTI's--she is following with urology. She has had InterStim placement and Botox treatment in the past. She also has history of bladder  cancer. Musculo: Reports arthritis. Neuro: Reports difficulty with balance but denies dizziness, headache, seizures and syncope/Improved/no longer  following with neurology-symptoms have improved. Psych: Reports anxiety, depression and stress-stable-wishing no further medication.            REST OF PERTINENT ROS GONE OVER AND WAS NEGATIVE.                Current Outpatient Medications:     amLODIPine (NORVASC) 5 MG tablet, Take 1 tablet by mouth daily, Disp: 90 tablet, Rfl: 1    atorvastatin (LIPITOR) 20 MG tablet, TAKE 1 TABLET BY MOUTH EVERY OTHER DAY, Disp: 45 tablet, Rfl: 1    losartan (COZAAR) 100 MG tablet, Take 1 tablet by mouth daily, Disp: 90 tablet, Rfl: 1    Turmeric (CURCUMIN 95 PO), Take by mouth, Disp: , Rfl:     vitamin D (CHOLECALCIFEROL) 1000 UNIT TABS tablet, Take by mouth, Disp: , Rfl:     vitamin B-12 (CYANOCOBALAMIN) 1000 MCG tablet, Take by mouth, Disp: , Rfl:     Multiple Vitamins-Minerals (MULTIVITAMIN ADULT) TABS, Take by mouth, Disp: , Rfl:     magnesium oxide (MAG-OX) 400 MG tablet, Take by mouth, Disp: , Rfl:   No Known Allergies    Past Medical History:   Diagnosis Date    Abdominal hernia     SANKOVIC    Anxiety     Arthritis     shoulders; injected by dr Rajiv Small    Bladder cancer [facility])     DDD (degenerative disc disease), cervical     lumbar    Depression     Diverticulosis     Dysphagia     Hiatal hernia     Hyperlipidemia     Hypertension     Hypothyroidism     Incontinence     Attends meetings of clubs or organizations: Not on file     Relationship status: Not on file    Intimate partner violence     Fear of current or ex partner: Not on file     Emotionally abused: Not on file     Physically abused: Not on file     Forced sexual activity: Not on file   Other Topics Concern    Not on file   Social History Narrative    Not on file       There were no vitals filed for this visit. Physical Exam  Constitutional:       General: She is not in acute distress. HENT:      Head: Normocephalic and atraumatic. Neck:      Musculoskeletal: Normal range of motion. Pulmonary:      Effort: Pulmonary effort is normal.   Musculoskeletal: Normal range of motion. Neurological:      Mental Status: She is alert and oriented to person, place, and time. Psychiatric:         Mood and Affect: Mood normal.         Behavior: Behavior normal.         Thought Content: Thought content normal.         Judgment: Judgment normal.                 Assessment and Plan:  Patricia Marroquin was seen today for hypertension and hyperlipidemia. 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 I did order blood work today to monitor disease progression and medication use. Prescription management performed. Fall precautions. Notify with problems in the interim.     Return in about 3 months (around 2/11/2021) for vvv.    Seen By:  Clinton Bolanos MD      *Document was created using voice recognition software. Note was reviewed however may contain grammatical errors.

## 2020-11-12 ENCOUNTER — TELEPHONE (OUTPATIENT)
Dept: FAMILY MEDICINE CLINIC | Age: 85
End: 2020-11-12

## 2020-11-12 LAB
ALBUMIN SERPL-MCNC: 4.2 G/DL (ref 3.5–5.2)
ALP BLD-CCNC: 80 U/L (ref 35–104)
ALT SERPL-CCNC: 14 U/L (ref 0–32)
ANION GAP SERPL CALCULATED.3IONS-SCNC: 11 MMOL/L (ref 7–16)
AST SERPL-CCNC: 24 U/L (ref 0–31)
BASOPHILS ABSOLUTE: 0.03 E9/L (ref 0–0.2)
BASOPHILS RELATIVE PERCENT: 0.6 % (ref 0–2)
BILIRUB SERPL-MCNC: 0.8 MG/DL (ref 0–1.2)
BUN BLDV-MCNC: 17 MG/DL (ref 8–23)
CALCIUM SERPL-MCNC: 9.2 MG/DL (ref 8.6–10.2)
CHLORIDE BLD-SCNC: 103 MMOL/L (ref 98–107)
CO2: 27 MMOL/L (ref 22–29)
CREAT SERPL-MCNC: 0.9 MG/DL (ref 0.5–1)
EOSINOPHILS ABSOLUTE: 0.14 E9/L (ref 0.05–0.5)
EOSINOPHILS RELATIVE PERCENT: 2.6 % (ref 0–6)
GFR AFRICAN AMERICAN: >60
GFR NON-AFRICAN AMERICAN: 59 ML/MIN/1.73
GLUCOSE BLD-MCNC: 106 MG/DL (ref 74–99)
HCT VFR BLD CALC: 45.5 % (ref 34–48)
HEMOGLOBIN: 13.5 G/DL (ref 11.5–15.5)
IMMATURE GRANULOCYTES #: 0.01 E9/L
IMMATURE GRANULOCYTES %: 0.2 % (ref 0–5)
LYMPHOCYTES ABSOLUTE: 1.59 E9/L (ref 1.5–4)
LYMPHOCYTES RELATIVE PERCENT: 29.3 % (ref 20–42)
MCH RBC QN AUTO: 27.2 PG (ref 26–35)
MCHC RBC AUTO-ENTMCNC: 29.7 % (ref 32–34.5)
MCV RBC AUTO: 91.7 FL (ref 80–99.9)
MONOCYTES ABSOLUTE: 0.56 E9/L (ref 0.1–0.95)
MONOCYTES RELATIVE PERCENT: 10.3 % (ref 2–12)
NEUTROPHILS ABSOLUTE: 3.1 E9/L (ref 1.8–7.3)
NEUTROPHILS RELATIVE PERCENT: 57 % (ref 43–80)
PDW BLD-RTO: 13.7 FL (ref 11.5–15)
PLATELET # BLD: 233 E9/L (ref 130–450)
PMV BLD AUTO: 11.8 FL (ref 7–12)
POTASSIUM SERPL-SCNC: 4.4 MMOL/L (ref 3.5–5)
RBC # BLD: 4.96 E12/L (ref 3.5–5.5)
SODIUM BLD-SCNC: 141 MMOL/L (ref 132–146)
TOTAL PROTEIN: 6.9 G/DL (ref 6.4–8.3)
WBC # BLD: 5.4 E9/L (ref 4.5–11.5)

## 2021-01-25 ENCOUNTER — OFFICE VISIT (OUTPATIENT)
Dept: FAMILY MEDICINE CLINIC | Age: 86
End: 2021-01-25
Payer: MEDICARE

## 2021-01-25 VITALS
BODY MASS INDEX: 28.35 KG/M2 | SYSTOLIC BLOOD PRESSURE: 136 MMHG | WEIGHT: 160 LBS | TEMPERATURE: 98.5 F | HEIGHT: 63 IN | HEART RATE: 81 BPM | RESPIRATION RATE: 18 BRPM | DIASTOLIC BLOOD PRESSURE: 72 MMHG | OXYGEN SATURATION: 97 %

## 2021-01-25 DIAGNOSIS — R30.0 DYSURIA: ICD-10-CM

## 2021-01-25 DIAGNOSIS — R30.0 DYSURIA: Primary | ICD-10-CM

## 2021-01-25 LAB
BILIRUBIN, POC: ABNORMAL
BLOOD URINE, POC: ABNORMAL
CLARITY, POC: ABNORMAL
COLOR, POC: ABNORMAL
GLUCOSE URINE, POC: ABNORMAL
KETONES, POC: ABNORMAL
LEUKOCYTE EST, POC: ABNORMAL
NITRITE, POC: POSITIVE
PH, POC: 6
PROTEIN, POC: 100
SPECIFIC GRAVITY, POC: 1.02
UROBILINOGEN, POC: 0.2

## 2021-01-25 PROCEDURE — 99213 OFFICE O/P EST LOW 20 MIN: CPT | Performed by: PHYSICIAN ASSISTANT

## 2021-01-25 PROCEDURE — 81002 URINALYSIS NONAUTO W/O SCOPE: CPT | Performed by: PHYSICIAN ASSISTANT

## 2021-01-25 RX ORDER — CEPHALEXIN 500 MG/1
500 CAPSULE ORAL 2 TIMES DAILY
Qty: 14 CAPSULE | Refills: 0 | Status: SHIPPED | OUTPATIENT
Start: 2021-01-25 | End: 2021-02-01

## 2021-01-25 RX ORDER — ERGOCALCIFEROL (VITAMIN D2) 10 MCG
TABLET ORAL
COMMUNITY
Start: 2020-12-04

## 2021-01-25 RX ORDER — FLUOROURACIL 50 MG/G
CREAM TOPICAL
COMMUNITY
Start: 2021-01-20 | End: 2022-03-02 | Stop reason: CLARIF

## 2021-01-25 NOTE — PROGRESS NOTES
Chief Complaint:   Dysuria      History of Present Illness       Brianna Ramesh is a 80 y.o. old female who  presents to [facility] for dysuria, pressure, and urgency for past 2 days. Denies back or flank pain. Denies fever or chills. Denies any pelvic pain, abdominal pain, vaginal discharge, vomiting, diarrhea, or lethargy. Eating and drinking ok. ROS    Unless otherwise stated in this report or unable to obtain because of the patient's clinical or mental status as evidenced by the medical record, this patients's positive and negative responses for Review of Systems, constitutional, psych, eyes, ENT, cardiovascular, respiratory, gastrointestinal, neurological, genitourinary, musculoskeletal, integument systems and systems related to the presenting problem are either stated in the preceding or were not pertinent or were negative for the symptoms and/or complaints related to the medical problem.     Past Medical History:   Past Medical History:   Diagnosis Date    Abdominal hernia     SANKOVIC    Anxiety     Arthritis     shoulders; injected by dr Marty Gutierrez cancer [facility])     DDD (degenerative disc disease), cervical     lumbar    Depression     Diverticulosis     Dysphagia     Hiatal hernia     Hyperlipidemia     Hypertension     Hypothyroidism     Incontinence     Macular degeneration     Pulmonary nodule     Reflex sympathetic dystrophy     Sleep apnea     Spigelian hernia     Spinal stenosis     lumbar    Tinnitus     Transient ischemic attack 01/2007    Valvular heart disease         Past Surgical history:   Past Surgical History:   Procedure Laterality Date    BLADDER REPAIR      x4    CARPAL TUNNEL RELEASE Right     CARPAL TUNNEL RELEASE Left 02/2018    kylah    CATARACT REMOVAL Bilateral     OVARY REMOVAL      PARTIAL HYSTERECTOMY  1977    TONSILLECTOMY  1941 Social History:  reports that she has never smoked. She has never used smokeless tobacco. She reports current alcohol use. Family History: family history includes Breast Cancer in her maternal aunt; Cancer in her paternal aunt and paternal uncle; Coronary Art Dis in her brother; Charlaine Marybeth in her sister; Other in her daughter and son; Pancreatic Cancer in her brother; Prostate Cancer in her brother and father; Stroke in her maternal grandmother and mother. Allergies: Patient has no known allergies. Physical Exam         VS:  /72   Pulse 81   Temp 98.5 °F (36.9 °C) (Temporal)   Resp 18   Ht 5' 3\" (1.6 m)   Wt 160 lb (72.6 kg)   SpO2 97%   BMI 28.34 kg/m²        Constitutional:  Alert, development consistent with age. HEENT:  Atraumatic. Airway patent. PERRL. Neck:  Normal ROM. Supple. Lungs:  Clear to auscultation and breath sounds equal.  Heart:  Regular rate and rhythm, normal heart sounds, without pathological murmurs, ectopy, gallops, or rubs. Abdomen: Soft, mild suprapubic tenderness without rebound, rigidity, or guarding. BS X 4. No organomegaly. Back: No CVA tenderness bilaterally. Skin:  Normal turgor. Warm, dry, without visible rash, unless noted elsewhere. Neurological:  Alert and oriented. Motor functions intact. Responds to verbal commands.     Lab / Imaging Results   (All laboratory and radiology results have been personally reviewed by myself)  Labs:  Results for orders placed or performed in visit on 01/25/21   POCT Urinalysis no Micro   Result Value Ref Range    Color, UA gold     Clarity, UA cloudy     Glucose, UA POC neg     Bilirubin, UA small     Ketones, UA neg     Spec Grav, UA 1.025     Blood, UA POC small     pH, UA 6.0     Protein, UA      Urobilinogen, UA 0.2     Leukocytes, UA moderate     Nitrite, UA positive          Medical Decision Making:

## 2021-01-27 LAB
ORGANISM: ABNORMAL
URINE CULTURE, ROUTINE: ABNORMAL

## 2021-02-10 ENCOUNTER — OFFICE VISIT (OUTPATIENT)
Dept: FAMILY MEDICINE CLINIC | Age: 86
End: 2021-02-10
Payer: MEDICARE

## 2021-02-10 VITALS
DIASTOLIC BLOOD PRESSURE: 88 MMHG | TEMPERATURE: 97.9 F | OXYGEN SATURATION: 97 % | HEART RATE: 95 BPM | SYSTOLIC BLOOD PRESSURE: 130 MMHG | BODY MASS INDEX: 28.34 KG/M2 | WEIGHT: 160 LBS

## 2021-02-10 DIAGNOSIS — R30.0 DYSURIA: ICD-10-CM

## 2021-02-10 DIAGNOSIS — R30.0 DYSURIA: Primary | ICD-10-CM

## 2021-02-10 LAB
BILIRUBIN, POC: ABNORMAL
BLOOD URINE, POC: ABNORMAL
CLARITY, POC: ABNORMAL
COLOR, POC: YELLOW
GLUCOSE URINE, POC: ABNORMAL
KETONES, POC: ABNORMAL
LEUKOCYTE EST, POC: ABNORMAL
NITRITE, POC: POSITIVE
PH, POC: 6.5
PROTEIN, POC: ABNORMAL
SPECIFIC GRAVITY, POC: 1.02
UROBILINOGEN, POC: 0.2

## 2021-02-10 PROCEDURE — 99213 OFFICE O/P EST LOW 20 MIN: CPT | Performed by: PHYSICIAN ASSISTANT

## 2021-02-10 PROCEDURE — 81002 URINALYSIS NONAUTO W/O SCOPE: CPT | Performed by: PHYSICIAN ASSISTANT

## 2021-02-10 RX ORDER — AMOXICILLIN AND CLAVULANATE POTASSIUM 875; 125 MG/1; MG/1
1 TABLET, FILM COATED ORAL 2 TIMES DAILY
Qty: 14 TABLET | Refills: 0 | Status: SHIPPED | OUTPATIENT
Start: 2021-02-10 | End: 2021-02-17

## 2021-02-10 NOTE — PROGRESS NOTES
kylah    CATARACT REMOVAL Bilateral     OVARY REMOVAL      PARTIAL HYSTERECTOMY  1977    TONSILLECTOMY  1941     Social History:  reports that she has never smoked. She has never used smokeless tobacco. She reports current alcohol use. Family History: family history includes Breast Cancer in her maternal aunt; Cancer in her paternal aunt and paternal uncle; Coronary Art Dis in her brother; Newell Roche in her sister; Other in her daughter and son; Pancreatic Cancer in her brother; Prostate Cancer in her brother and father; Stroke in her maternal grandmother and mother. Allergies: Patient has no known allergies. Physical Exam         VS:  /88   Pulse 95   Temp 97.9 °F (36.6 °C) (Temporal)   Wt 160 lb (72.6 kg)   SpO2 97%   BMI 28.34 kg/m²        Constitutional:  Alert, development consistent with age. HEENT:  Atraumatic. Airway patent. PERRL. Neck:  Normal ROM. Supple. Lungs:  Clear to auscultation and breath sounds equal.  Heart:  Regular rate and rhythm, normal heart sounds, without pathological murmurs, ectopy, gallops, or rubs. Abdomen: Soft, mild suprapubic tenderness without rebound, rigidity, or guarding. BS X 4. No organomegaly. Back: No CVA tenderness bilaterally. Skin:  Normal turgor. Warm, dry, without visible rash, unless noted elsewhere. Neurological:  Alert and oriented. Motor functions intact. Responds to verbal commands.     Lab / Imaging Results   (All laboratory and radiology results have been personally reviewed by myself)  Labs:  Results for orders placed or performed in visit on 02/10/21   POCT Urinalysis no Micro   Result Value Ref Range    Color, UA yellow     Clarity, UA cloudy     Glucose, UA POC neg     Bilirubin, UA neg     Ketones, UA neg     Spec Grav, UA 1.025     Blood, UA POC small     pH, UA 6.5     Protein, UA POC 100mg     Urobilinogen, UA 0.2     Leukocytes, UA large     Nitrite, UA positive          Medical Decision Making:    Patient is well appearing, non toxic and appropriate for outpatient management. Plan is for symptom management and PCP follow up. Assessment / Plan     Impression(s):  Assessment and Plan:  Slade Richard was seen today for dysuria. Diagnoses and all orders for this visit:    Dysuria  -     POCT Urinalysis no Micro  -     amoxicillin-clavulanate (AUGMENTIN) 875-125 MG per tablet; Take 1 tablet by mouth 2 times daily for 7 days  -     Culture, Urine; Future        Follow up with PCP in 7-10 days for repeat urine and recheck of symptoms. ER if changes or worse. Advised to take all medications as directed.      TODD HaroC

## 2021-02-11 ENCOUNTER — OFFICE VISIT (OUTPATIENT)
Dept: FAMILY MEDICINE CLINIC | Age: 86
End: 2021-02-11
Payer: MEDICARE

## 2021-02-11 VITALS
TEMPERATURE: 97.7 F | OXYGEN SATURATION: 96 % | SYSTOLIC BLOOD PRESSURE: 142 MMHG | WEIGHT: 157.2 LBS | DIASTOLIC BLOOD PRESSURE: 70 MMHG | HEART RATE: 75 BPM | BODY MASS INDEX: 27.85 KG/M2 | HEIGHT: 63 IN

## 2021-02-11 DIAGNOSIS — C67.9 MALIGNANT NEOPLASM OF URINARY BLADDER, UNSPECIFIED SITE (HCC): ICD-10-CM

## 2021-02-11 DIAGNOSIS — N39.46 MIXED STRESS AND URGE URINARY INCONTINENCE: ICD-10-CM

## 2021-02-11 DIAGNOSIS — E78.5 HYPERLIPIDEMIA, UNSPECIFIED HYPERLIPIDEMIA TYPE: ICD-10-CM

## 2021-02-11 DIAGNOSIS — I10 ESSENTIAL HYPERTENSION: ICD-10-CM

## 2021-02-11 PROCEDURE — 99213 OFFICE O/P EST LOW 20 MIN: CPT | Performed by: INTERNAL MEDICINE

## 2021-02-11 RX ORDER — TRIAMCINOLONE ACETONIDE 1 MG/G
CREAM TOPICAL
Qty: 45 G | Refills: 0 | Status: SHIPPED
Start: 2021-02-11 | End: 2022-06-29 | Stop reason: ALTCHOICE

## 2021-02-11 ASSESSMENT — PATIENT HEALTH QUESTIONNAIRE - PHQ9
2. FEELING DOWN, DEPRESSED OR HOPELESS: 0
1. LITTLE INTEREST OR PLEASURE IN DOING THINGS: 0
SUM OF ALL RESPONSES TO PHQ QUESTIONS 1-9: 0
SUM OF ALL RESPONSES TO PHQ9 QUESTIONS 1 & 2: 0
SUM OF ALL RESPONSES TO PHQ QUESTIONS 1-9: 0

## 2021-02-11 NOTE — PROGRESS NOTES
[address] [facility] [address] [facility]     21  Celine Chavez : 1932 Sex: female  Age: 80 y.o. Chief Complaint   Patient presents with    Hyperlipidemia    Hypertension       HPI   Patient presents today for follow-up visit on her multiple medical problems. She is accompanied by her  today. She does bring in a small list of blood pressures all of which look somewhat marginal in the mid 130 to mid 627 range systolic. She is currently on losartan and amlodipine and seems to be tolerating these medications okay. She is 80years old and at this point I am not inclined to drive her pressure down too much further. She was seen in [facility] yesterday for UTI and started on Augmentin. She states she is already feeling some better. Her lipids have been stable on statin medication. She continues to follow with urology for her urinary symptoms and bladder cancer. She states the Botox is really not doing a lot. She does have InterStim as well. Nothing is really worked well over the years and she states she has resigned to accept this and live with it. She did go to [facility] clinic and plastics to remove basal cell cancer started her on Efudex. She also tells me she had her second Covid vaccine improvement no rash chest and anterior neck. I told her I would give her some topical steroid for the itch as I did not wish to give her p.o. steroid around the vaccine. She does continue to follow with urology for her bladder cancer and Botox injections for urinary incontinence.  Also follows with ophthalmology for her macular degeneration.     Review of Systems   Const: Denies chills, fever and sweats. ENMT: Denies ear symptoms. Reports postnasal drip, but denies other nasal symptoms. Denies mouth or throat  symptoms. CV: Denies chest pain, orthopnea and palpitations. Resp: Denies cough, SOB and wheezing. GI: Denies diarrhea, nausea and vomiting.   : Urinary: reports incontinence, but denies dysuria, frequency and frequent UTI's--she is following with urology. She has had InterStim placement and Botox treatment in the past. She also has history of bladder  cancer. Musculo: Reports arthritis. Neuro: Reports difficulty with balance but denies dizziness, headache, seizures and syncope/Improved/no longer  following with neurology-symptoms have improved. Psych: Reports anxiety, depression and stress-stable-wishing no further medication.             REST OF PERTINENT ROS GONE OVER AND WAS NEGATIVE. Current Outpatient Medications:     triamcinolone (KENALOG) 0.1 % cream, Apply topically 2 times daily. , Disp: 45 g, Rfl: 0    amoxicillin-clavulanate (AUGMENTIN) 875-125 MG per tablet, Take 1 tablet by mouth 2 times daily for 7 days, Disp: 14 tablet, Rfl: 0    Multiple Vitamin (DAILY VALUE MULTIVITAMIN) TABS, MULTIPLE VITAMIN DAILY VALUE MULTIVITAMIN TABS 1 tablet daily 2020/12/04 MULTIPLE VITAMIN [phone] Christy Georges MD [address]  [address] [place] [address] ([id number]), Disp: , Rfl:     metroNIDAZOLE (METROCREAM) 0.75 % cream, Apply topically 2 times daily, Disp: , Rfl:     fluorouracil (EFUDEX) 5 % cream, , Disp: , Rfl:     amLODIPine (NORVASC) 5 MG tablet, Take 1 tablet by mouth daily, Disp: 90 tablet, Rfl: 1    atorvastatin (LIPITOR) 20 MG tablet, TAKE 1 TABLET BY MOUTH EVERY OTHER DAY, Disp: 45 tablet, Rfl: 1    losartan (COZAAR) 100 MG tablet, Take 1 tablet by mouth daily, Disp: 90 tablet, Rfl: 1    Turmeric (CURCUMIN 95 PO), Take by mouth, Disp: , Rfl:     vitamin D (CHOLECALCIFEROL) 1000 UNIT TABS tablet, Take by mouth, Disp: , Rfl:     vitamin B-12 (CYANOCOBALAMIN) 1000 MCG tablet, Take by mouth, Disp: , Rfl:     magnesium oxide (MAG-OX) 400 MG tablet, Take by mouth, Disp: , Rfl:   No Known Allergies    Past Medical History:   Diagnosis Date    Abdominal hernia     SANKOVIC    Anxiety     Arthritis     shoulders; injected by dr Nestor Renae cancer ([facility] [address]     DDD (degenerative disc disease), cervical     lumbar    Depression     Diverticulosis     Dysphagia     Hiatal hernia     Hyperlipidemia     Hypertension     Hypothyroidism     Incontinence     Macular degeneration     Pulmonary nodule     Reflex sympathetic dystrophy     Sleep apnea     Spigelian hernia     Spinal stenosis     lumbar    Tinnitus     Transient ischemic attack 01/2007    Valvular heart disease      Past Surgical History:   Procedure Laterality Date    BLADDER REPAIR      x4    CARPAL TUNNEL RELEASE Right     CARPAL TUNNEL RELEASE Left 02/2018    kylah    CATARACT REMOVAL Bilateral     OVARY REMOVAL      PARTIAL HYSTERECTOMY  1977    TONSILLECTOMY  1941     Family History   Problem Relation Age of Onset    Stroke Mother     Prostate Cancer Father     Lung Cancer Sister     Prostate Cancer Brother     Coronary Art Dis Brother     Pancreatic Cancer Brother     Breast Cancer Maternal Aunt     Cancer Paternal Aunt         ovarian, stomach    Cancer Paternal Uncle     Stroke Maternal Grandmother     Other Son         neuroendocine malignancy    Other Daughter         neuroendocine malignancy     Social History     Socioeconomic History    Marital status:      Spouse name: Not on file    Number of children: Not on file    Years of education: Not on file    Highest education level: Not on file   Occupational History    Not on file   Social Needs    Financial resource strain: Not on file    Food insecurity     Worry: Not on file     Inability: Not on file    Transportation needs     Medical: Not on file     Non-medical: Not on file   Tobacco Use    Smoking status: Never Smoker    Smokeless tobacco: Never Used   Substance and Sexual Activity    Alcohol use: Yes     Comment: occ    Drug use: Not on file    Sexual activity: Not on file   Lifestyle    Physical activity     Days per week: Not on file     Minutes per session: Not on file    Stress: Not on file   Relationships    Social connections     Talks on phone: Not on file     Gets together: Not on file     Attends Rastafari service: Not on file     Active member of club or organization: Not on file     Attends meetings of clubs or organizations: Not on file     Relationship status: Not on file    Intimate partner violence     Fear of current or ex partner: Not on file     Emotionally abused: Not on file     Physically abused: Not on file     Forced sexual activity: Not on file   Other Topics Concern    Not on file   Social History Narrative    Not on file       Vitals:    02/11/21 1056   BP: (!) 142/70   Pulse: 75   Temp: 97.7 °F (36.5 °C)   TempSrc: Temporal   SpO2: 96%   Weight: 157 lb 3.2 oz (71.3 kg)   Height: 5' 3\" (1.6 m)       Physical Exam    Const: Appears well developed and well nourished. No signs of acute distress present. Neck: Supple and symmetric. Palpation reveals no adenopathy. No masses appreciated. Thyroid exhibits no nodule  or thyromegaly. No JVD. Carotids: 2+ and equal bilaterally, without bruits. Resp: No rales, rhonchi or wheezes appreciated over the lungs bilaterally. CV: Rate is regular. Rhythm is regular/occasional ectopy. S1 is normal. S2 is normal. No gallop or rubs. No heart  murmur appreciated. Extremities: Edema, but no clubbing or cyanosis/trace  Abdomen: Bowel sounds are normoactive. Palpation reveals softness, with no distension, organomegaly or  tenderness. No abdominal masses palpable. No palpable hepatosplenomegaly. Musculo: Walks with a normal gait I don't see any ataxia at this time. Upper Extremities: Limitation with movement of  the upper extremities bilaterally. Lower Extremities: Full ROM bilaterally. Psych: Patient's attitude is cooperative. Patient's affect is appropriate. Judgement is realistic. Insight is appropriate.   Neurologically grossly intact without focal deficits noted.           Assessment and Plan:  Ronald Vicente was seen today for hyperlipidemia and hypertension. Diagnoses and all orders for this visit:    Essential hypertension    Hyperlipidemia, unspecified hyperlipidemia type    Malignant neoplasm of urinary bladder, unspecified site [facility])    Mixed stress and urge urinary incontinence    Other orders  -     triamcinolone (KENALOG) 0.1 % cream; Apply topically 2 times daily. Plan: I did ask her to start checking blood pressure more closely at least twice a week and document. Call in the next couple weeks if numbers are not coming under 140. May decide to increase her amlodipine to 10 mg at that point. Follow with above consultants. 4-month follow-up at which time we will check labs on her. Notify us of problems in the interim. I did send triamcinolone cream to the pharmacy for the rash and itching. Return in about 4 months (around 6/11/2021). Seen By:  rAchana Hunter MD      *Document was created using voice recognition software. Note was reviewed however may contain grammatical errors.

## 2021-02-13 LAB
ORGANISM: ABNORMAL
URINE CULTURE, ROUTINE: ABNORMAL

## 2021-03-22 ENCOUNTER — TELEPHONE (OUTPATIENT)
Dept: ADMINISTRATIVE | Age: 86
End: 2021-03-22

## 2021-03-22 NOTE — TELEPHONE ENCOUNTER
Patient called to schedule appt w/Dr Roma Peter, a lot going on in her life, in a state of depression; declined appt 3/24 and 3/31; didn't want to wait that long in this condition; transferred call to clinical line

## 2021-03-23 NOTE — TELEPHONE ENCOUNTER
Left message for patient to see how she was feeling and to see if she wanted to schedule appointment to be seen

## 2021-03-24 NOTE — TELEPHONE ENCOUNTER
Spoke with , Maki Connors. Pt is flying to [place] to be with her dying sister. If anything further is needed they will give our office a call back.

## 2021-04-09 ENCOUNTER — OFFICE VISIT (OUTPATIENT)
Dept: FAMILY MEDICINE CLINIC | Age: 86
End: 2021-04-09
Payer: MEDICARE

## 2021-04-09 VITALS
DIASTOLIC BLOOD PRESSURE: 68 MMHG | BODY MASS INDEX: 27.46 KG/M2 | TEMPERATURE: 97.9 F | OXYGEN SATURATION: 96 % | HEART RATE: 99 BPM | SYSTOLIC BLOOD PRESSURE: 122 MMHG | WEIGHT: 155 LBS | HEIGHT: 63 IN

## 2021-04-09 DIAGNOSIS — R30.0 DYSURIA: Primary | ICD-10-CM

## 2021-04-09 DIAGNOSIS — R30.0 DYSURIA: ICD-10-CM

## 2021-04-09 LAB
BILIRUBIN, POC: NEGATIVE
BLOOD URINE, POC: NORMAL
CLARITY, POC: NORMAL
COLOR, POC: NORMAL
GLUCOSE URINE, POC: NORMAL
KETONES, POC: NORMAL
LEUKOCYTE EST, POC: NORMAL
NITRITE, POC: NEGATIVE
PH, POC: 5.5
PROTEIN, POC: 30
SPECIFIC GRAVITY, POC: >=1.03
UROBILINOGEN, POC: 0.2

## 2021-04-09 PROCEDURE — 81003 URINALYSIS AUTO W/O SCOPE: CPT | Performed by: PHYSICIAN ASSISTANT

## 2021-04-09 PROCEDURE — 99213 OFFICE O/P EST LOW 20 MIN: CPT | Performed by: PHYSICIAN ASSISTANT

## 2021-04-09 RX ORDER — CEPHALEXIN 500 MG/1
500 CAPSULE ORAL 2 TIMES DAILY
Qty: 14 CAPSULE | Refills: 0 | Status: SHIPPED | OUTPATIENT
Start: 2021-04-09 | End: 2021-04-16

## 2021-04-09 NOTE — PROGRESS NOTES
Chief Complaint:   Dysuria (burning and frequency)      History of Present Illness       Catina Whaley is a 80 y.o. old female who  presents to [facility] for dysuria, frequency for past 2 days. Denies back or flank pain. Denies fever or chills. States feels like her typical UTI. Denies any pelvic pain, abdominal pain, vaginal discharge, vomiting, diarrhea, or lethargy. Pt eating and drinking normally. Denies other concerns and presents for evaluation. ROS    Unless otherwise stated in this report or unable to obtain because of the patient's clinical or mental status as evidenced by the medical record, this patients's positive and negative responses for Review of Systems, constitutional, psych, eyes, ENT, cardiovascular, respiratory, gastrointestinal, neurological, genitourinary, musculoskeletal, integument systems and systems related to the presenting problem are either stated in the preceding or were not pertinent or were negative for the symptoms and/or complaints related to the medical problem.     Past Medical History:   Past Medical History:   Diagnosis Date    Abdominal hernia     SANKOVIC    Anxiety     Arthritis     shoulders; injected by dr April Garcia [facility])     DDD (degenerative disc disease), cervical     lumbar    Depression     Diverticulosis     Dysphagia     Hiatal hernia     Hyperlipidemia     Hypertension     Hypothyroidism     Incontinence     Macular degeneration     Pulmonary nodule     Reflex sympathetic dystrophy     Sleep apnea     Spigelian hernia     Spinal stenosis     lumbar    Tinnitus     Transient ischemic attack 01/2007    Valvular heart disease         Past Surgical history:   Past Surgical History:   Procedure Laterality Date    BLADDER REPAIR      x4    CARPAL TUNNEL RELEASE Right     CARPAL TUNNEL RELEASE Left 02/2018    kylah    CATARACT REMOVAL Bilateral     OVARY REMOVAL      PARTIAL HYSTERECTOMY  1977    TONSILLECTOMY  1941 Social History:  reports that she has never smoked. She has never used smokeless tobacco. She reports current alcohol use. Family History: family history includes Breast Cancer in her maternal aunt; Cancer in her paternal aunt and paternal uncle; Coronary Art Dis in her brother; Violetta Jews in her sister; Other in her daughter and son; Pancreatic Cancer in her brother; Prostate Cancer in her brother and father; Stroke in her maternal grandmother and mother. Allergies: Patient has no known allergies. Physical Exam         VS:  /68   Pulse 99   Temp 97.9 °F (36.6 °C)   Ht 5' 3\" (1.6 m)   Wt 155 lb (70.3 kg)   SpO2 96%   BMI 27.46 kg/m²        Constitutional:  Alert, development consistent with age. HEENT:  Atraumatic. Airway patent. PERRL. Neck:  Normal ROM. Supple. Lungs:  Clear to auscultation and breath sounds equal.  Heart:  Regular rate and rhythm, normal heart sounds, without pathological murmurs, ectopy, gallops, or rubs. Abdomen: Soft, no suprapubic tenderness without rebound, rigidity, or guarding. BS X 4. No organomegaly. Back: No CVA tenderness bilaterally. Skin:  Normal turgor. Warm, dry, without visible rash, unless noted elsewhere. Neurological:  Alert and oriented. Motor functions intact. Responds to verbal commands. Lab / Imaging Results   (All laboratory and radiology results have been personally reviewed by myself)  Labs:  Results for orders placed or performed in visit on 04/09/21   POCT Urinalysis No Micro (Auto)   Result Value Ref Range    Color, UA dark yellow     Clarity, UA cloudy     Glucose, UA POC negativne     Bilirubin, UA negative     Ketones, UA trace     Spec Grav, UA >=1.030     Blood, UA POC tract-intact     pH, UA 5.5     Protein, UA POC 30     Urobilinogen, UA 0.2     Leukocytes, UA large     Nitrite, UA negative          Medical Decision Making:    Patient is well appearing, non toxic and appropriate for outpatient management.   Plan is for symptom management and PCP follow up. Assessment / Plan     Impression(s):  Assessment and Plan:  Shayan was seen today for dysuria. Diagnoses and all orders for this visit:    Dysuria  -     POCT Urinalysis No Micro (Auto)  -     Culture, Urine; Future  -     cephALEXin (KEFLEX) 500 MG capsule; Take 1 capsule by mouth 2 times daily for 7 days        Follow up with PCP in 7-10 days for repeat urine and recheck of symptoms. ER if changes or worse. Advised to take all medications as directed.      TODD HaroC

## 2021-04-10 LAB — URINE CULTURE, ROUTINE: NORMAL

## 2021-05-25 DIAGNOSIS — E78.5 HYPERLIPIDEMIA, UNSPECIFIED HYPERLIPIDEMIA TYPE: ICD-10-CM

## 2021-05-25 RX ORDER — ATORVASTATIN CALCIUM 20 MG/1
TABLET, FILM COATED ORAL
Qty: 45 TABLET | Refills: 1 | Status: SHIPPED
Start: 2021-05-25 | End: 2021-11-01 | Stop reason: SDUPTHER

## 2021-05-25 NOTE — TELEPHONE ENCOUNTER
Last Appointment:  2/11/2021  Future Appointments   Date Time Provider Kandi Gonzalez   6/17/2021 11:00 AM Cyn Prado  [address] [place] [address]

## 2021-06-17 ENCOUNTER — OFFICE VISIT (OUTPATIENT)
Dept: FAMILY MEDICINE CLINIC | Age: 86
End: 2021-06-17
Payer: MEDICARE

## 2021-06-17 VITALS
WEIGHT: 156.8 LBS | BODY MASS INDEX: 27.78 KG/M2 | HEART RATE: 75 BPM | DIASTOLIC BLOOD PRESSURE: 70 MMHG | TEMPERATURE: 97.5 F | HEIGHT: 63 IN | SYSTOLIC BLOOD PRESSURE: 122 MMHG | OXYGEN SATURATION: 100 %

## 2021-06-17 DIAGNOSIS — D51.1 VIT B12 DEFIC ANEMIA D/T SLCTV VIT B12 MALABSORP W PROTEIN: ICD-10-CM

## 2021-06-17 DIAGNOSIS — C67.9 MALIGNANT NEOPLASM OF URINARY BLADDER, UNSPECIFIED SITE (HCC): ICD-10-CM

## 2021-06-17 DIAGNOSIS — E55.9 VITAMIN D DEFICIENCY: ICD-10-CM

## 2021-06-17 DIAGNOSIS — I10 ESSENTIAL HYPERTENSION: ICD-10-CM

## 2021-06-17 DIAGNOSIS — E78.5 HYPERLIPIDEMIA, UNSPECIFIED HYPERLIPIDEMIA TYPE: ICD-10-CM

## 2021-06-17 DIAGNOSIS — Z00.00 ROUTINE GENERAL MEDICAL EXAMINATION AT A HEALTH CARE FACILITY: Primary | ICD-10-CM

## 2021-06-17 DIAGNOSIS — G47.00 INSOMNIA, UNSPECIFIED TYPE: ICD-10-CM

## 2021-06-17 DIAGNOSIS — F32.1 CURRENT MODERATE EPISODE OF MAJOR DEPRESSIVE DISORDER WITHOUT PRIOR EPISODE (HCC): ICD-10-CM

## 2021-06-17 DIAGNOSIS — N39.46 MIXED STRESS AND URGE URINARY INCONTINENCE: ICD-10-CM

## 2021-06-17 LAB
ALBUMIN SERPL-MCNC: 4 G/DL (ref 3.5–5.2)
ALP BLD-CCNC: 79 U/L (ref 35–104)
ALT SERPL-CCNC: 9 U/L (ref 0–32)
ANION GAP SERPL CALCULATED.3IONS-SCNC: 10 MMOL/L (ref 7–16)
AST SERPL-CCNC: 25 U/L (ref 0–31)
BASOPHILS ABSOLUTE: 0.03 E9/L (ref 0–0.2)
BASOPHILS RELATIVE PERCENT: 0.5 % (ref 0–2)
BILIRUB SERPL-MCNC: 0.6 MG/DL (ref 0–1.2)
BUN BLDV-MCNC: 19 MG/DL (ref 6–23)
CALCIUM SERPL-MCNC: 9.1 MG/DL (ref 8.6–10.2)
CHLORIDE BLD-SCNC: 103 MMOL/L (ref 98–107)
CHOLESTEROL, TOTAL: 212 MG/DL (ref 0–199)
CO2: 27 MMOL/L (ref 22–29)
CREAT SERPL-MCNC: 0.9 MG/DL (ref 0.5–1)
EOSINOPHILS ABSOLUTE: 0.29 E9/L (ref 0.05–0.5)
EOSINOPHILS RELATIVE PERCENT: 4.7 % (ref 0–6)
GFR AFRICAN AMERICAN: >60
GFR NON-AFRICAN AMERICAN: 59 ML/MIN/1.73
GLUCOSE BLD-MCNC: 102 MG/DL (ref 74–99)
HCT VFR BLD CALC: 44.7 % (ref 34–48)
HDLC SERPL-MCNC: 40 MG/DL
HEMOGLOBIN: 13.2 G/DL (ref 11.5–15.5)
IMMATURE GRANULOCYTES #: 0.02 E9/L
IMMATURE GRANULOCYTES %: 0.3 % (ref 0–5)
LDL CHOLESTEROL CALCULATED: 147 MG/DL (ref 0–99)
LYMPHOCYTES ABSOLUTE: 1.37 E9/L (ref 1.5–4)
LYMPHOCYTES RELATIVE PERCENT: 22 % (ref 20–42)
MAGNESIUM: 2.3 MG/DL (ref 1.6–2.6)
MCH RBC QN AUTO: 26.9 PG (ref 26–35)
MCHC RBC AUTO-ENTMCNC: 29.5 % (ref 32–34.5)
MCV RBC AUTO: 91.2 FL (ref 80–99.9)
MONOCYTES ABSOLUTE: 0.62 E9/L (ref 0.1–0.95)
MONOCYTES RELATIVE PERCENT: 10 % (ref 2–12)
NEUTROPHILS ABSOLUTE: 3.89 E9/L (ref 1.8–7.3)
NEUTROPHILS RELATIVE PERCENT: 62.5 % (ref 43–80)
PDW BLD-RTO: 13.7 FL (ref 11.5–15)
PLATELET # BLD: 248 E9/L (ref 130–450)
PMV BLD AUTO: 11.1 FL (ref 7–12)
POTASSIUM SERPL-SCNC: 4.8 MMOL/L (ref 3.5–5)
RBC # BLD: 4.9 E12/L (ref 3.5–5.5)
SODIUM BLD-SCNC: 140 MMOL/L (ref 132–146)
TOTAL PROTEIN: 7 G/DL (ref 6.4–8.3)
TRIGL SERPL-MCNC: 126 MG/DL (ref 0–149)
TSH SERPL DL<=0.05 MIU/L-ACNC: 2.47 UIU/ML (ref 0.27–4.2)
VITAMIN B-12: 462 PG/ML (ref 211–946)
VITAMIN D 25-HYDROXY: 57 NG/ML (ref 30–100)
VLDLC SERPL CALC-MCNC: 25 MG/DL
WBC # BLD: 6.2 E9/L (ref 4.5–11.5)

## 2021-06-17 PROCEDURE — 99214 OFFICE O/P EST MOD 30 MIN: CPT | Performed by: INTERNAL MEDICINE

## 2021-06-17 PROCEDURE — G0439 PPPS, SUBSEQ VISIT: HCPCS | Performed by: INTERNAL MEDICINE

## 2021-06-17 RX ORDER — ZOLPIDEM TARTRATE 5 MG/1
5 TABLET ORAL NIGHTLY PRN
Qty: 15 TABLET | Refills: 0 | Status: SHIPPED | OUTPATIENT
Start: 2021-06-17 | End: 2021-07-17

## 2021-06-17 RX ORDER — LOSARTAN POTASSIUM 100 MG/1
100 TABLET ORAL DAILY
Qty: 90 TABLET | Refills: 1 | Status: SHIPPED
Start: 2021-06-17 | End: 2021-11-01 | Stop reason: SDUPTHER

## 2021-06-17 RX ORDER — AMLODIPINE BESYLATE 5 MG/1
5 TABLET ORAL DAILY
Qty: 90 TABLET | Refills: 1 | Status: SHIPPED
Start: 2021-06-17 | End: 2021-11-01 | Stop reason: SDUPTHER

## 2021-06-17 RX ORDER — MIRTAZAPINE 30 MG/1
30 TABLET, FILM COATED ORAL NIGHTLY
Qty: 30 TABLET | Refills: 5 | Status: SHIPPED
Start: 2021-06-17 | End: 2021-08-04 | Stop reason: SINTOL

## 2021-06-17 ASSESSMENT — PATIENT HEALTH QUESTIONNAIRE - PHQ9
SUM OF ALL RESPONSES TO PHQ QUESTIONS 1-9: 0
SUM OF ALL RESPONSES TO PHQ9 QUESTIONS 1 & 2: 0
SUM OF ALL RESPONSES TO PHQ QUESTIONS 1-9: 0
SUM OF ALL RESPONSES TO PHQ QUESTIONS 1-9: 0
2. FEELING DOWN, DEPRESSED OR HOPELESS: 0
1. LITTLE INTEREST OR PLEASURE IN DOING THINGS: 0

## 2021-06-17 ASSESSMENT — LIFESTYLE VARIABLES
HOW OFTEN DO YOU HAVE A DRINK CONTAINING ALCOHOL: NEVER
AUDIT-C TOTAL SCORE: INCOMPLETE
AUDIT TOTAL SCORE: INCOMPLETE
HOW OFTEN DO YOU HAVE A DRINK CONTAINING ALCOHOL: 0

## 2021-06-18 ENCOUNTER — TELEPHONE (OUTPATIENT)
Dept: FAMILY MEDICINE CLINIC | Age: 86
End: 2021-06-18

## 2021-06-18 NOTE — TELEPHONE ENCOUNTER
Shea Cazares called back and states that during their recent move, they were not eating their usual diet every day. Also, its possible that she may have missed some doses. They are settled in now and back to their normal routine, so the numbers will be better next time.

## 2021-06-18 NOTE — PATIENT INSTRUCTIONS
Personalized Preventive Plan for Nicola Saldana - 6/17/2021  Medicare offers a range of preventive health benefits. Some of the tests and screenings are paid in full while other may be subject to a deductible, co-insurance, and/or copay. Some of these benefits include a comprehensive review of your medical history including lifestyle, illnesses that may run in your family, and various assessments and screenings as appropriate. After reviewing your medical record and screening and assessments performed today your provider may have ordered immunizations, labs, imaging, and/or referrals for you. A list of these orders (if applicable) as well as your Preventive Care list are included within your After Visit Summary for your review. Other Preventive Recommendations:    · A preventive eye exam performed by an eye specialist is recommended every 1-2 years to screen for glaucoma; cataracts, macular degeneration, and other eye disorders. · A preventive dental visit is recommended every 6 months. · Try to get at least 150 minutes of exercise per week or 10,000 steps per day on a pedometer . · Order or download the FREE \"Exercise & Physical Activity: Your Everyday Guide\" from [employer]. Call 1-290.454.1804 or search [employer] online. · You need 3255-8676 mg of calcium and 4602-8563 IU of vitamin D per day. It is possible to meet your calcium requirement with diet alone, but a vitamin D supplement is usually necessary to meet this goal.  · When exposed to the sun, use a sunscreen that protects against both UVA and UVB radiation with an SPF of 30 or greater. Reapply every 2 to 3 hours or after sweating, drying off with a towel, or swimming. · Always wear a seat belt when traveling in a car. Always wear a helmet when riding a bicycle or motorcycle.

## 2021-06-18 NOTE — PROGRESS NOTES
Medicare Annual Wellness Visit  Name: Petrona Shi Date: 2021   MRN: <[id number]> Sex: Female   Age: 80 y.o. Ethnicity: Non-/Non    : 1932 Race: Michelle Wynne is here for Medicare AWV    Screenings for behavioral, psychosocial and functional/safety risks, and cognitive dysfunction are all negative except as indicated below. These results, as well as other patient data from the [address] form, are documented in Flowsheets linked to this Encounter. No Known Allergies    Prior to Visit Medications    Medication Sig Taking? Authorizing Provider   amLODIPine (NORVASC) 5 MG tablet Take 1 tablet by mouth daily  Trisha Arroyo MD   losartan (COZAAR) 100 MG tablet Take 1 tablet by mouth daily  Trisha Arroyo MD   zolpidem (AMBIEN) 5 MG tablet Take 1 tablet by mouth nightly as needed for Sleep for up to 30 days. Trisha Arroyo MD   mirtazapine (REMERON) 30 MG tablet Take 1 tablet by mouth nightly  Trisha Arroyo MD   atorvastatin (LIPITOR) 20 MG tablet TAKE 1 TABLET BY MOUTH EVERY OTHER DAY  Trisha Arroyo MD   triamcinolone (KENALOG) 0.1 % cream Apply topically 2 times daily.   Trisha Arroyo MD   Multiple Vitamin (DAILY VALUE MULTIVITAMIN) TABS MULTIPLE VITAMIN DAILY VALUE MULTIVITAMIN TABS 1 tablet daily  MULTIPLE VITAMIN [phone] Cecilia Sanford MD 2020  [address] [place] [address] ([id number])  Historical Provider, MD   metroNIDAZOLE (METROCREAM) 0.75 % cream Apply topically 2 times daily  Historical Provider, MD   fluorouracil (EFUDEX) 5 % cream   Historical Provider, MD   Turmeric (CURCUMIN 95 PO) Take by mouth  Historical Provider, MD   vitamin D (CHOLECALCIFEROL) 1000 UNIT TABS tablet Take by mouth  Historical Provider, MD   vitamin B-12 (CYANOCOBALAMIN) 1000 MCG tablet Take by mouth  Historical Provider, MD   magnesium oxide (MAG-OX) 400 MG tablet Take by mouth  Historical Provider, MD       Past Medical ([id number]) 02/25/2009, 09/28/2019    Td Vaccine >6yo [facility] 03/04/2010    Td, unspecified formulation 03/04/2010        Health Maintenance   Topic Date Due    COVID-19 Vaccine (1) Never done    DTaP/Tdap/Td vaccine (1 - Tdap) 07/14/1951    Shingles Vaccine (1 of 2) Never done   [employer] Visit (AWV)  Never done    Lipid screen  07/30/2021    Potassium monitoring  11/11/2021    Creatinine monitoring  11/11/2021    Flu vaccine  Completed    Pneumococcal 65+ years Vaccine  Completed    Hepatitis A vaccine  Aged Out    Hepatitis B vaccine  Aged Out    Hib vaccine  Aged Out    Meningococcal (ACWY) vaccine  Aged Out     Recommendations for [employer] Due: see orders and patient instructions/AVS.  . Recommended screening schedule for the next 5-10 years is provided to the patient in written form: see Patient Instructions/AVS.    There are no diagnoses linked to this encounter.

## 2021-06-20 NOTE — PROGRESS NOTES
[id number] [facility]     21  Levi Becker : 1932 Sex: female  Age: 80 y.o. Chief Complaint   Patient presents with    Hypertension       HPI  Patient presents today accompanied by her  and daughter for follow-up visit on her multiple medical problems. She has been a little bit more depressed recently due to family issues. She is also not sleeping. She has tried melatonin without much success. States her mind just races when she goes to bed. Try to get her on antidepressant medication recently she declined but is now willing to accept. I told her we could try Remeron for both anxiety depression and sleep benefit. She was willing to do this. I reviewed my last note. I did ask her to start checking blood pressures more closely which she has done. She does bring in numbers today. I decided not to make any further changes in her medication and just asked her to check pressures once or twice a week. She does have rather significant hearing deficit much of our conversation have to be either repeated or with her family member. Her lipids have been stable on statin medication. She continues to follow with urology for her urinary symptoms and bladder cancer. She states the Botox is really not doing a lot. She does have InterStim as well. Nothing is really worked well over the years and she states she has resigned to accept this and live with it. She has also been back to [facility] clinic to plastics related to basal cell cancer removal.  She does continue to follow with urology for her bladder cancer and Botox injections for urinary incontinence.  Also follows with ophthalmology for her macular degeneration and plastics for her skin cancer.     Review of Systems     Const: Denies chills, fever and sweats. ENMT: Denies ear symptoms. Reports postnasal drip, but denies other nasal symptoms. Denies mouth or throat  symptoms. CV: Denies chest pain, orthopnea and palpitations.   Resp: No Known Allergies    Past Medical History:   Diagnosis Date    Abdominal hernia     SANKOVIC    Anxiety     Arthritis     shoulders; injected by dr Bernetta Goodell Bladder [facility])     DDD (degenerative disc disease), cervical     lumbar    Depression     Diverticulosis     Dysphagia     Hiatal hernia     Hyperlipidemia     Hypertension     Hypothyroidism     Incontinence     Macular degeneration     Pulmonary nodule     Reflex sympathetic dystrophy     Sleep apnea     Spigelian hernia     Spinal stenosis     lumbar    Tinnitus     Transient ischemic attack 01/2007    Valvular heart disease      Past Surgical History:   Procedure Laterality Date    BLADDER REPAIR      x4    CARPAL TUNNEL RELEASE Right     CARPAL TUNNEL RELEASE Left 02/2018    kylah    CATARACT REMOVAL Bilateral     OVARY REMOVAL      PARTIAL HYSTERECTOMY  1977    TONSILLECTOMY  1941     Family History   Problem Relation Age of Onset    Stroke Mother     Prostate Cancer Father     Lung Cancer Sister     Prostate Cancer Brother     Coronary Art Dis Brother     Pancreatic Cancer Brother     Breast Cancer Maternal Aunt     Cancer Paternal Aunt         ovarian, stomach    Cancer Paternal Uncle     Stroke Maternal Grandmother     Other Son         neuroendocine malignancy    Other Daughter         neuroendocine malignancy     Social History     Socioeconomic History    Marital status:      Spouse name: Not on file    Number of children: Not on file    Years of education: Not on file    Highest education level: Not on file   Occupational History    Not on file   Tobacco Use    Smoking status: Never Smoker    Smokeless tobacco: Never Used   Substance and Sexual Activity    Alcohol use: Yes     Comment: occ    Drug use: Not on file    Sexual activity: Not on file   Other Topics Concern    Not on file   Social History Narrative    Not on file     Social Determinants of Health     Financial Resource Strain:     Difficulty of Paying Living Expenses:    Food Insecurity:     Worried About Running Out of Food in the Last Year:     [address] in the Last Year:    Transportation Needs:     Lack of Transportation (Medical):  Lack of Transportation (Non-Medical):    Physical Activity:     Days of Exercise per Week:     Minutes of Exercise per Session:    Stress:     Feeling of Stress :    Social Connections:     Frequency of Communication with Friends and Family:     Frequency of Social Gatherings with Friends and Family:     Attends Confucianism Services:     Active Member of Clubs or Organizations:     Attends Club or Organization Meetings:     Marital Status:    Intimate Partner Violence:     Fear of Current or Ex-Partner:     Emotionally Abused:     Physically Abused:     Sexually Abused:        Vitals:    06/17/21 1108   BP: 122/70   Pulse: 75   Temp: 97.5 °F (36.4 °C)   TempSrc: Temporal   SpO2: 100%   Weight: 156 lb 12.8 oz (71.1 kg)   Height: 5' 3\" (1.6 m)       Physical Exam  Const: Appears well developed and well nourished. No signs of acute distress present. Neck: Supple and symmetric. Palpation reveals no adenopathy. No masses appreciated. Thyroid exhibits no nodule  or thyromegaly. No JVD. Carotids: 2+ and equal bilaterally, without bruits. Resp: No rales, rhonchi or wheezes appreciated over the lungs bilaterally. CV: Rate is regular. Rhythm is regular/occasional ectopy. S1 is normal. S2 is normal. No gallop or rubs. No heart  murmur appreciated. Extremities: Edema, but no clubbing or cyanosis/trace  Abdomen: Bowel sounds are normoactive. Palpation reveals softness, with no distension, organomegaly or  tenderness. No abdominal masses palpable. No palpable hepatosplenomegaly. Musculo: Walks with a normal gait I don't see any ataxia at this time. Upper Extremities: Limitation with movement of  the upper extremities bilaterally. Lower Extremities: Full ROM bilaterally.   Psych: okay.  Return in about 6 weeks (around 7/29/2021). Seen By:  Johnathan Venegas MD      *Document was created using voice recognition software. Note was reviewed however may contain grammatical errors.

## 2021-07-07 ENCOUNTER — TELEPHONE (OUTPATIENT)
Dept: FAMILY MEDICINE CLINIC | Age: 86
End: 2021-07-07

## 2021-08-04 ENCOUNTER — OFFICE VISIT (OUTPATIENT)
Dept: FAMILY MEDICINE CLINIC | Age: 86
End: 2021-08-04
Payer: MEDICARE

## 2021-08-04 VITALS
HEIGHT: 63 IN | HEART RATE: 59 BPM | WEIGHT: 156.8 LBS | DIASTOLIC BLOOD PRESSURE: 66 MMHG | SYSTOLIC BLOOD PRESSURE: 124 MMHG | OXYGEN SATURATION: 96 % | TEMPERATURE: 98.7 F | BODY MASS INDEX: 27.78 KG/M2

## 2021-08-04 DIAGNOSIS — F32.1 CURRENT MODERATE EPISODE OF MAJOR DEPRESSIVE DISORDER WITHOUT PRIOR EPISODE (HCC): ICD-10-CM

## 2021-08-04 DIAGNOSIS — F41.9 ANXIETY: ICD-10-CM

## 2021-08-04 DIAGNOSIS — I10 ESSENTIAL HYPERTENSION: ICD-10-CM

## 2021-08-04 DIAGNOSIS — G47.00 INSOMNIA, UNSPECIFIED TYPE: ICD-10-CM

## 2021-08-04 PROCEDURE — 99214 OFFICE O/P EST MOD 30 MIN: CPT | Performed by: INTERNAL MEDICINE

## 2021-08-04 RX ORDER — ZOLPIDEM TARTRATE 5 MG/1
5 TABLET ORAL NIGHTLY PRN
COMMUNITY

## 2021-08-04 SDOH — ECONOMIC STABILITY: FOOD INSECURITY: WITHIN THE PAST 12 MONTHS, YOU WORRIED THAT YOUR FOOD WOULD RUN OUT BEFORE YOU GOT MONEY TO BUY MORE.: NEVER TRUE

## 2021-08-04 SDOH — ECONOMIC STABILITY: FOOD INSECURITY: WITHIN THE PAST 12 MONTHS, THE FOOD YOU BOUGHT JUST DIDN'T LAST AND YOU DIDN'T HAVE MONEY TO GET MORE.: NEVER TRUE

## 2021-08-04 ASSESSMENT — SOCIAL DETERMINANTS OF HEALTH (SDOH): HOW HARD IS IT FOR YOU TO PAY FOR THE VERY BASICS LIKE FOOD, HOUSING, MEDICAL CARE, AND HEATING?: NOT HARD AT ALL

## 2021-08-04 NOTE — PROGRESS NOTES
cancer. Review of Systems     Const: Denies chills, fever and sweats. ENMT: Denies ear symptoms. Reports postnasal drip, but denies other nasal symptoms. Denies mouth or throat  symptoms. CV: Denies chest pain, orthopnea and palpitations. Resp: Denies cough, SOB and wheezing. GI: Denies diarrhea, nausea and vomiting. : Urinary: reports incontinence, but denies dysuria, frequency and frequent UTI's--she is following with urology. She has had InterStim placement and Botox treatment in the past. She also has history of bladder  cancer. Musculo: Reports arthritis. Neuro: Reports difficulty with balance but denies dizziness, headache, seizures and syncope/Improved/no longer  following with neurology-symptoms have improved. Psych: Reports anxiety, depression and stress and insomnia-see above regarding starting medication   Did not tolerate mirtazapine will try sertraline      REST OF PERTINENT ROS GONE OVER AND WAS NEGATIVE. PMH:  Problem List: Essential hypertension, Benign essential hypertension, Taking medication, Hypothyroidism  Health Maintenance:  Couseled on Home Safety - (2/5/2018)  Mammogram - (5/1/2011)  Mammogram Screening - (5/31/2007)  Colonoscopy - (5/19/2010)  Bone Density Test Screening - (6/19/2008)  Pelvic/Pap Exam - with Dr. Latha Mcintyre  Rectal Exam - 5/10  Bone Density Scan - 6/06,6/08,9/13,8/17-nl  Duplex Carotid Ultasound - 11/06,3/09  2D ECHO - 6/14  EKG - 12/12,8/13,5/14,10/14,3/16,4/16  Hemmocult Cards - 7/15-neg x 3  Holter monitor - 3/16  Prevnar Vaccine - (7/2015)  Pneumonia Vaccination - (2009)  Tetanus Immunization - (2010)  Zoster/Shingles Vaccine - (2016) [facility]  Influenza Vaccination - (9/2016)  Medical Problems:  Hypertension - . Hypothyroidism - . Incontinence - .bladder interstim/botox  transient ischemic attack - (1/2007) . Abdominal Hernia Left - followed by Dr. Aleena Kolb  Dysphagia - .   Tinnitus - .  Varicose Veins - tx with sclerotherapy  Arthritis - shoulders; injected by Dr Aydin Quarles - follows with urology  reflex sympathetic dystrophy  Pulmonary Nodule - followed and stable  Sleep Apnea  Hyperlipidemia - statin intolerance  Anxiety, Depression, Osteoarthritis, LLQ spigelian hernia repair, Hiatal Hernia, Lumbar DDD, Lumbar Spinal Stenosis,  Diverticulosis  Valvular Heart Disease - mild  InterStim placement - 3/16  Macular Degeneration  Left carpal tunnel surgery -   Basal cell skin cancer  Insomnia  Surgical Hx:  Bladder Repair - X4  Ovarian Cyst - . Oophorectomy- - . Hysterectomy, Partial - () . Tonsillectomy W/ Adenoidectomy - () (age 10 Years) . Endoscopy - (2006) . Cataract Removal - bilateral  carpal tunnel release - remotely, right hand  carpal tunnel release, left - (2018) Dr. Fahad Kiser: Charmaine Johnson, for reading  OB/Gyn Hx:: (3)Parity: Full term (3)  Reviewed, no changes.                   Current Outpatient Medications:     zolpidem (AMBIEN) 5 MG tablet, Take 5 mg by mouth nightly as needed for Sleep., Disp: , Rfl:     sertraline (ZOLOFT) 50 MG tablet, 1/2 pill po daily x 1 week then 1 pill daily thereafter, Disp: 30 tablet, Rfl: 5    amLODIPine (NORVASC) 5 MG tablet, Take 1 tablet by mouth daily, Disp: 90 tablet, Rfl: 1    losartan (COZAAR) 100 MG tablet, Take 1 tablet by mouth daily, Disp: 90 tablet, Rfl: 1    atorvastatin (LIPITOR) 20 MG tablet, TAKE 1 TABLET BY MOUTH EVERY OTHER DAY, Disp: 45 tablet, Rfl: 1    triamcinolone (KENALOG) 0.1 % cream, Apply topically 2 times daily. , Disp: 45 g, Rfl: 0    Multiple Vitamin (DAILY VALUE MULTIVITAMIN) TABS, MULTIPLE VITAMIN DAILY VALUE MULTIVITAMIN TABS 1 tablet daily  MULTIPLE VITAMIN [phone] Hector Oden MD 2020  [address] [place] [address] ([id number]), Disp: , Rfl:     metroNIDAZOLE (METROCREAM) 0.75 % cream, Apply topically 2 times daily, Disp: , Rfl:     fluorouracil (EFUDEX) 5 % cream, , Disp: , Rfl:     Turmeric (CURCUMIN 95 PO), Take by mouth, Disp: , Rfl:     vitamin D (CHOLECALCIFEROL) 1000 UNIT TABS tablet, Take by mouth, Disp: , Rfl:     vitamin B-12 (CYANOCOBALAMIN) 1000 MCG tablet, Take by mouth, Disp: , Rfl:     magnesium oxide (MAG-OX) 400 MG tablet, Take by mouth, Disp: , Rfl:   No Known Allergies    Past Medical History:   Diagnosis Date    Abdominal hernia     SANKOVIC    Anxiety     Arthritis     shoulders; injected by dr Roselyn Caraballo    Bladder [facility])     DDD (degenerative disc disease), cervical     lumbar    Depression     Diverticulosis     Dysphagia     Hiatal hernia     Hyperlipidemia     Hypertension     Hypothyroidism     Incontinence     Macular degeneration     Pulmonary nodule     Reflex sympathetic dystrophy     Sleep apnea     Spigelian hernia     Spinal stenosis     lumbar    Tinnitus     Transient ischemic attack 01/2007    Valvular heart disease      Past Surgical History:   Procedure Laterality Date    BLADDER REPAIR      x4    CARPAL TUNNEL RELEASE Right     CARPAL TUNNEL RELEASE Left 02/2018    kylah    CATARACT REMOVAL Bilateral     OVARY REMOVAL      PARTIAL HYSTERECTOMY  1977    TONSILLECTOMY  1941     Family History   Problem Relation Age of Onset    Stroke Mother     Prostate Cancer Father     Lung Cancer Sister     Prostate Cancer Brother     Coronary Art Dis Brother     Pancreatic Cancer Brother     Breast Cancer Maternal Aunt     Cancer Paternal Aunt         ovarian, stomach    Cancer Paternal Uncle     Stroke Maternal Grandmother     Other Son         neuroendocine malignancy    Other Daughter         neuroendocine malignancy     Social History     Socioeconomic History    Marital status:      Spouse name: Not on file    Number of children: Not on file    Years of education: Not on file    Highest education level: Not on file   Occupational History    Not on file   Tobacco Use    Smoking status: Never Smoker  Smokeless tobacco: Never Used   Substance and Sexual Activity    Alcohol use: Yes     Comment: occ    Drug use: Not on file    Sexual activity: Not on file   Other Topics Concern    Not on file   Social History Narrative    Not on file     Social Determinants of Health     Financial Resource Strain: Low Risk     Difficulty of Paying Living Expenses: Not hard at all   Food Insecurity: No Food Insecurity    Worried About Running Out of Food in the Last Year: Never true    Silvino of Food in the Last Year: Never true   Transportation Needs:     Lack of Transportation (Medical):  Lack of Transportation (Non-Medical):    Physical Activity:     Days of Exercise per Week:     Minutes of Exercise per Session:    Stress:     Feeling of Stress :    Social Connections:     Frequency of Communication with Friends and Family:     Frequency of Social Gatherings with Friends and Family:     Attends Sabianist Services:     Active Member of Clubs or Organizations:     Attends Club or Organization Meetings:     Marital Status:    Intimate Partner Violence:     Fear of Current or Ex-Partner:     Emotionally Abused:     Physically Abused:     Sexually Abused:        Vitals:    08/04/21 1107   BP: 124/66   Pulse: 59   Temp: 98.7 °F (37.1 °C)   TempSrc: Temporal   SpO2: 96%   Weight: 156 lb 12.8 oz (71.1 kg)   Height: 5' 3\" (1.6 m)       Physical Exam    Const: Appears well developed and well nourished. No signs of acute distress present. Neck: Supple and symmetric. Palpation reveals no adenopathy. No masses appreciated. Thyroid exhibits no nodule  or thyromegaly. No JVD. Carotids: 2+ and equal bilaterally, without bruits. Resp: No rales, rhonchi or wheezes appreciated over the lungs bilaterally. CV: Rate is regular. Rhythm is regular/occasional ectopy. S1 is normal. S2 is normal. No gallop or rubs. No heart  murmur appreciated. Extremities: Edema, but no clubbing or cyanosis/trace  Abdomen:  Bowel sounds

## 2021-09-15 ENCOUNTER — OFFICE VISIT (OUTPATIENT)
Dept: FAMILY MEDICINE CLINIC | Age: 86
End: 2021-09-15
Payer: MEDICARE

## 2021-09-15 VITALS
HEART RATE: 85 BPM | WEIGHT: 155.6 LBS | TEMPERATURE: 98 F | BODY MASS INDEX: 27.57 KG/M2 | OXYGEN SATURATION: 96 % | HEIGHT: 63 IN | SYSTOLIC BLOOD PRESSURE: 134 MMHG | DIASTOLIC BLOOD PRESSURE: 68 MMHG

## 2021-09-15 DIAGNOSIS — F32.9 CURRENT EPISODE OF MAJOR DEPRESSIVE DISORDER WITHOUT PRIOR EPISODE, UNSPECIFIED DEPRESSION EPISODE SEVERITY: ICD-10-CM

## 2021-09-15 DIAGNOSIS — F41.9 ANXIETY: ICD-10-CM

## 2021-09-15 DIAGNOSIS — S51.812A SKIN TEAR OF FOREARM WITHOUT COMPLICATION, LEFT, INITIAL ENCOUNTER: ICD-10-CM

## 2021-09-15 PROCEDURE — 99213 OFFICE O/P EST LOW 20 MIN: CPT | Performed by: INTERNAL MEDICINE

## 2021-09-15 PROCEDURE — 90715 TDAP VACCINE 7 YRS/> IM: CPT | Performed by: INTERNAL MEDICINE

## 2021-09-15 PROCEDURE — 90471 IMMUNIZATION ADMIN: CPT | Performed by: INTERNAL MEDICINE

## 2021-09-15 RX ORDER — SERTRALINE HYDROCHLORIDE 100 MG/1
100 TABLET, FILM COATED ORAL DAILY
Qty: 30 TABLET | Refills: 5 | Status: SHIPPED
Start: 2021-09-15 | End: 2022-06-29 | Stop reason: ALTCHOICE

## 2021-09-15 NOTE — PROGRESS NOTES
[facility]     9/15/21  Nichole [place]rona : 1932 Sex: female  Age: 80 y.o. Chief Complaint   Patient presents with    Hypertension     6 weeks       HPI    Patient presents today for short-term follow-up visit on her medical problems. Last visit we started sertraline for anxiety/depression. She states this has helped her although not as much as she would have hoped. I told her at this point since it did help I would recommend we increase the dose and see if we get a little bit better response. She was agreeable to this. I told her gone from 50 mg to 100 mg daily. I did review last note. She also sustained a skin tear to left forearm 4 days ago on the corner of her . Has been placing Neosporin and dressing it. She does continue to follow with urology for her bladder cancer and Botox injections for urinary incontinence. Mohan Hurtado follows with ophthalmology for her macular degeneration and plastics for her skin cancer. Review of Systems     Const: Denies chills, fever and sweats. ENMT: Denies ear symptoms. Reports postnasal drip, but denies other nasal symptoms. Denies mouth or throat  symptoms. CV: Denies chest pain, orthopnea and palpitations. Resp: Denies cough, SOB and wheezing. GI: Denies diarrhea, nausea and vomiting. : Urinary: reports incontinence, but denies dysuria, frequency and frequent UTI's--she is following with urology. She has had InterStim placement and Botox treatment in the past. She also has history of bladder  cancer. Musculo: Reports arthritis. Neuro: Reports difficulty with balance but denies dizziness, headache, seizures and syncope/Improved/no longer  following with neurology-symptoms have improved. Psych: Reports anxiety, depression and stress and insomnia-some better. Will increase dose to 100 mg. Did not tolerate Remeron      REST OF PERTINENT ROS GONE OVER AND WAS NEGATIVE.                Current Outpatient Medications:     sertraline (ZOLOFT) 100 MG tablet, Take 1 tablet by mouth daily, Disp: 30 tablet, Rfl: 5    zolpidem (AMBIEN) 5 MG tablet, Take 5 mg by mouth nightly as needed for Sleep., Disp: , Rfl:     amLODIPine (NORVASC) 5 MG tablet, Take 1 tablet by mouth daily, Disp: 90 tablet, Rfl: 1    losartan (COZAAR) 100 MG tablet, Take 1 tablet by mouth daily, Disp: 90 tablet, Rfl: 1    atorvastatin (LIPITOR) 20 MG tablet, TAKE 1 TABLET BY MOUTH EVERY OTHER DAY, Disp: 45 tablet, Rfl: 1    triamcinolone (KENALOG) 0.1 % cream, Apply topically 2 times daily. , Disp: 45 g, Rfl: 0    Multiple Vitamin (DAILY VALUE MULTIVITAMIN) TABS, MULTIPLE VITAMIN DAILY VALUE MULTIVITAMIN TABS 1 tablet daily 2020/12/04 MULTIPLE VITAMIN [id number] Sky Coon MD 12-  [address] [place] [address] ([facility]), Disp: , Rfl:     metroNIDAZOLE (METROCREAM) 0.75 % cream, Apply topically 2 times daily, Disp: , Rfl:     fluorouracil (EFUDEX) 5 % cream, , Disp: , Rfl:     Turmeric (CURCUMIN 95 PO), Take by mouth, Disp: , Rfl:     vitamin D (CHOLECALCIFEROL) 1000 UNIT TABS tablet, Take by mouth, Disp: , Rfl:     vitamin B-12 (CYANOCOBALAMIN) 1000 MCG tablet, Take by mouth, Disp: , Rfl:     magnesium oxide (MAG-OX) 400 MG tablet, Take by mouth, Disp: , Rfl:   No Known Allergies    Past Medical History:   Diagnosis Date    Abdominal hernia     SANKOVIC    Anxiety     Arthritis     shoulders; injected by dr Shruthi Quiros [facility])     DDD (degenerative disc disease), cervical     lumbar    Depression     Diverticulosis     Dysphagia     Hiatal hernia     Hyperlipidemia     Hypertension     Hypothyroidism     Incontinence     Macular degeneration     Pulmonary nodule     Reflex sympathetic dystrophy     Sleep apnea     Spigelian hernia     Spinal stenosis     lumbar    Tinnitus     Transient ischemic attack 01/2007    Valvular heart disease      Past Surgical History:   Procedure Laterality Date    BLADDER REPAIR      x4    CARPAL TUNNEL RELEASE Right     CARPAL TUNNEL RELEASE Left 02/2018    kylah    CATARACT REMOVAL Bilateral     OVARY REMOVAL      PARTIAL HYSTERECTOMY  1977    TONSILLECTOMY  1941     Family History   Problem Relation Age of Onset    Stroke Mother     Prostate Cancer Father     Lung Cancer Sister     Prostate Cancer Brother     Coronary Art Dis Brother     Pancreatic Cancer Brother     Breast Cancer Maternal Aunt     Cancer Paternal Aunt         ovarian, stomach    Cancer Paternal Uncle     Stroke Maternal Grandmother     Other Son         neuroendocine malignancy    Other Daughter         neuroendocine malignancy     Social History     Socioeconomic History    Marital status:      Spouse name: Not on file    Number of children: Not on file    Years of education: Not on file    Highest education level: Not on file   Occupational History    Not on file   Tobacco Use    Smoking status: Never Smoker    Smokeless tobacco: Never Used   Substance and Sexual Activity    Alcohol use: Yes     Comment: occ    Drug use: Not on file    Sexual activity: Not on file   Other Topics Concern    Not on file   Social History Narrative    Not on file     Social Determinants of Health     Financial Resource Strain: Low Risk     Difficulty of Paying Living Expenses: Not hard at all   Food Insecurity: No Food Insecurity    Worried About [address] in the Last Year: Never true    [facility] in the Last Year: Never true   Transportation Needs:     Lack of Transportation (Medical):      Lack of Transportation (Non-Medical):    Physical Activity:     Days of Exercise per Week:     Minutes of Exercise per Session:    Stress:     Feeling of Stress :    Social Connections:     Frequency of Communication with Friends and Family:     Frequency of Social Gatherings with Friends and Family:     Attends Orthodox Services:     Active Member of Clubs or Organizations:     Attends Club or Organization Meetings:     Marital Status:    Intimate Partner Violence:     Fear of Current or Ex-Partner:     Emotionally Abused:     Physically Abused:     Sexually Abused:        Vitals:    09/15/21 1534   BP: 134/68   Pulse: 85   Temp: 98 °F (36.7 °C)   TempSrc: Temporal   SpO2: 96%   Weight: 155 lb 9.6 oz (70.6 kg)   Height: 5' 3\" (1.6 m)       Physical Exam    Const: Appears well developed and well nourished. No signs of acute distress present. Neck: Supple and symmetric. Palpation reveals no adenopathy. No masses appreciated. Thyroid exhibits no nodule  or thyromegaly. No JVD. Carotids: 2+ and equal bilaterally, without bruits. Resp: No rales, rhonchi or wheezes appreciated over the lungs bilaterally. CV: Rate is regular. Rhythm is regular/occasional ectopy. S1 is normal. S2 is normal. No gallop or rubs. No heart  murmur appreciated. Extremities: Edema, but no clubbing or cyanosis/trace  Abdomen: Bowel sounds are normoactive. Palpation reveals softness, with no distension, organomegaly or  tenderness. No abdominal masses palpable. No palpable hepatosplenomegaly. Psych: Patient's attitude is cooperative. affect appears much less depressed today. Herchel March is realistic. Insight is appropriate. Neurologically grossly intact without focal deficits noted. Skin: She does have a skin tear left forearm that she sustained on her  edge. Looks superficial.  3days old. Wound clean use Bactroban ointment and dress. Watch for signs of infection. No evidence of infection at this time.           Assessment and Plan:  Farzad Laureano was seen today for hypertension. Diagnoses and all orders for this visit:    Anxiety    Current episode of major depressive disorder without prior episode, unspecified depression episode severity    Skin tear of forearm without complication, left, initial encounter    Other orders  -     sertraline (ZOLOFT) 100 MG tablet;  Take 1 tablet by mouth daily  -     Tdap (age 6y and older) IM ([address])    Plan: I asked her to keep her appointment for next month. I doubled the dose of her sertraline. We will check all of her labs on her next time I see her. Tdap injection for her skin tear left forearm. Notify us of problems in the interim. Return for keep appt. Seen By:  Kali Velez MD      *Document was created using voice recognition software. Note was reviewed however may contain grammatical errors.

## 2021-11-01 ENCOUNTER — OFFICE VISIT (OUTPATIENT)
Dept: FAMILY MEDICINE CLINIC | Age: 86
End: 2021-11-01
Payer: MEDICARE

## 2021-11-01 VITALS
SYSTOLIC BLOOD PRESSURE: 146 MMHG | HEIGHT: 63 IN | BODY MASS INDEX: 27.64 KG/M2 | OXYGEN SATURATION: 98 % | WEIGHT: 156 LBS | DIASTOLIC BLOOD PRESSURE: 74 MMHG | HEART RATE: 82 BPM | TEMPERATURE: 97.7 F

## 2021-11-01 DIAGNOSIS — F32.9 CURRENT EPISODE OF MAJOR DEPRESSIVE DISORDER WITHOUT PRIOR EPISODE, UNSPECIFIED DEPRESSION EPISODE SEVERITY: Primary | ICD-10-CM

## 2021-11-01 DIAGNOSIS — I10 ESSENTIAL HYPERTENSION: ICD-10-CM

## 2021-11-01 DIAGNOSIS — E78.5 HYPERLIPIDEMIA, UNSPECIFIED HYPERLIPIDEMIA TYPE: ICD-10-CM

## 2021-11-01 DIAGNOSIS — G47.00 INSOMNIA, UNSPECIFIED TYPE: ICD-10-CM

## 2021-11-01 PROCEDURE — 99213 OFFICE O/P EST LOW 20 MIN: CPT | Performed by: INTERNAL MEDICINE

## 2021-11-01 RX ORDER — LOSARTAN POTASSIUM 100 MG/1
100 TABLET ORAL DAILY
Qty: 90 TABLET | Refills: 1 | Status: SHIPPED
Start: 2021-11-01 | End: 2022-06-03 | Stop reason: SDUPTHER

## 2021-11-01 RX ORDER — AMLODIPINE BESYLATE 5 MG/1
5 TABLET ORAL DAILY
Qty: 90 TABLET | Refills: 1 | Status: SHIPPED
Start: 2021-11-01 | End: 2022-04-01

## 2021-11-01 RX ORDER — ATORVASTATIN CALCIUM 20 MG/1
20 TABLET, FILM COATED ORAL EVERY OTHER DAY
Qty: 45 TABLET | Refills: 1 | Status: SHIPPED
Start: 2021-11-01 | End: 2022-06-03 | Stop reason: SDUPTHER

## 2021-11-01 NOTE — PROGRESS NOTES
[facility]     [address]  [place] : 1932 Sex: female  Age: 80 y.o. Chief Complaint   Patient presents with    Hypertension     4 months       HPI  Patient presents today for 4-month follow-up visit on her medical problems. I recently did see her and increased her sertraline which has made some difference. Still voices some degree of depression but is doing better. She is adjusting a little better to her change in [place] [facility] Blood pressures been in a good range at home today was a little bit marginal.  Lipids have been stable on statin medication. She does continue to follow with urology for her bladder cancer and Botox injections for urinary incontinence.  Also follows with ophthalmology for her macular degeneration and plastics for her skin cancer      Review of Systems     Const: Denies chills, fever and sweats. ENMT: Denies ear symptoms. Reports postnasal drip, but denies other nasal symptoms. Denies mouth or throat  symptoms. CV: Denies chest pain, orthopnea and palpitations. Resp: Denies cough, SOB and wheezing. GI: Denies diarrhea, nausea and vomiting. : Urinary: reports incontinence, but denies dysuria, frequency and frequent UTI's--she is following with urology. She has had InterStim placement and Botox treatment in the past. She also has history of bladder  cancer. Musculo: Reports arthritis. Neuro: Reports difficulty with balance but denies dizziness, headache, seizures and syncope/Improved/no longer  following with neurology-symptoms have improved. Psych: Reports anxiety, depression and stress and insomnia-some better. increased dose to 100 mg last visit. Did not tolerate Remeron         REST OF PERTINENT ROS GONE OVER AND WAS NEGATIVE.                Current Outpatient Medications:     losartan (COZAAR) 100 MG tablet, Take 1 tablet by mouth daily, Disp: 90 tablet, Rfl: 1    atorvastatin (LIPITOR) 20 MG tablet, Take 1 tablet by mouth every other day, Disp: 45 tablet, Rfl: 1    amLODIPine (NORVASC) 5 MG tablet, Take 1 tablet by mouth daily, Disp: 90 tablet, Rfl: 1    sertraline (ZOLOFT) 100 MG tablet, Take 1 tablet by mouth daily, Disp: 30 tablet, Rfl: 5    zolpidem (AMBIEN) 5 MG tablet, Take 5 mg by mouth nightly as needed for Sleep., Disp: , Rfl:     triamcinolone (KENALOG) 0.1 % cream, Apply topically 2 times daily. , Disp: 45 g, Rfl: 0    Multiple Vitamin (DAILY VALUE MULTIVITAMIN) TABS, MULTIPLE VITAMIN DAILY VALUE MULTIVITAMIN TABS 1 tablet daily 2020/12/04 MULTIPLE VITAMIN [id number] Toni Guzman MD 12-  [address] [place] [address] ([id number]), Disp: , Rfl:     metroNIDAZOLE (METROCREAM) 0.75 % cream, Apply topically 2 times daily, Disp: , Rfl:     fluorouracil (EFUDEX) 5 % cream, , Disp: , Rfl:     Turmeric (CURCUMIN 95 PO), Take by mouth, Disp: , Rfl:     vitamin D (CHOLECALCIFEROL) 1000 UNIT TABS tablet, Take by mouth, Disp: , Rfl:     vitamin B-12 (CYANOCOBALAMIN) 1000 MCG tablet, Take by mouth, Disp: , Rfl:     magnesium oxide (MAG-OX) 400 MG tablet, Take by mouth, Disp: , Rfl:   No Known Allergies    Past Medical History:   Diagnosis Date    Abdominal hernia     SANKOVIC    Anxiety     Arthritis     shoulders; injected by dr Francisca An    Bladder cancer [facility])     DDD (degenerative disc disease), cervical     lumbar    Depression     Diverticulosis     Dysphagia     Hiatal hernia     Hyperlipidemia     Hypertension     Hypothyroidism     Incontinence     Macular degeneration     Pulmonary nodule     Reflex sympathetic dystrophy     Sleep apnea     Spigelian hernia     Spinal stenosis     lumbar    Tinnitus     Transient ischemic attack 01/2007    Valvular heart disease      Past Surgical History:   Procedure Laterality Date    BLADDER REPAIR      x4    CARPAL TUNNEL RELEASE Right     CARPAL TUNNEL RELEASE Left 02/2018    kylah    CATARACT REMOVAL Bilateral     OVARY REMOVAL      PARTIAL HYSTERECTOMY  1977    TONSILLECTOMY  1941     Family History   Problem Relation Age of Onset    Stroke Mother     Prostate Cancer Father     Lung Cancer Sister     Prostate Cancer Brother     Coronary Art Dis Brother     Pancreatic Cancer Brother     Breast Cancer Maternal Aunt     Cancer Paternal Aunt         ovarian, stomach    Cancer Paternal Uncle     Stroke Maternal Grandmother     Other Son         neuroendocine malignancy    Other Daughter         neuroendocine malignancy     Social History     Socioeconomic History    Marital status:      Spouse name: Not on file    Number of children: Not on file    Years of education: Not on file    Highest education level: Not on file   Occupational History    Not on file   Tobacco Use    Smoking status: Never Smoker    Smokeless tobacco: Never Used   Substance and Sexual Activity    Alcohol use: Yes     Comment: occ    Drug use: Not on file    Sexual activity: Not on file   Other Topics Concern    Not on file   Social History Narrative    Not on file     Social Determinants of Health     Financial Resource Strain: Low Risk     Difficulty of Paying Living Expenses: Not hard at all   Food Insecurity: No Food Insecurity    Worried About [address] in the Last Year: Never true    [address] [employer] in the Last Year: Never true   Transportation Needs:     Lack of Transportation (Medical):      Lack of Transportation (Non-Medical):    Physical Activity:     Days of Exercise per Week:     Minutes of Exercise per Session:    Stress:     Feeling of Stress :    Social Connections:     Frequency of Communication with Friends and Family:     Frequency of Social Gatherings with Friends and Family:     Attends Taoist Services:     Active Member of Clubs or Organizations:     Attends Club or Organization Meetings:     Marital Status:    Intimate Partner Violence:     Fear of Current or Ex-Partner:     Emotionally Abused:     Physically Abused:     Sexually Abused:        Vitals:    11/01/21 1305   BP: (!) 146/74   Pulse: 82   Temp: 97.7 °F (36.5 °C)   TempSrc: Temporal   SpO2: 98%   Weight: 156 lb (70.8 kg)   Height: 5' 3\" (1.6 m)       Physical Exam    Const: Appears well developed and well nourished. No signs of acute distress present. Neck: Supple and symmetric. Palpation reveals no adenopathy. No masses appreciated. Thyroid exhibits no nodule  or thyromegaly. No JVD. Carotids: 2+ and equal bilaterally, without bruits. Resp: No rales, rhonchi or wheezes appreciated over the lungs bilaterally. CV: Rate is regular. Rhythm is regular/occasional ectopy. S1 is normal. S2 is normal. No gallop or rubs. No heart  murmur appreciated. Extremities: Edema, but no clubbing or cyanosis/trace  Abdomen: Bowel sounds are normoactive. Palpation reveals softness, with no distension, organomegaly or  tenderness. No abdominal masses palpable. No palpable hepatosplenomegaly. Psych: Patient's attitude is cooperative. affect appears  less depressed today. Davidson Earl is realistic. Insight is appropriate. Neurologically grossly intact without focal deficits noted. Assessment and Plan:  Nicolas Leyva was seen today for hypertension. Diagnoses and all orders for this visit:    Essential hypertension  -     losartan (COZAAR) 100 MG tablet; Take 1 tablet by mouth daily    Hyperlipidemia, unspecified hyperlipidemia type  -     atorvastatin (LIPITOR) 20 MG tablet; Take 1 tablet by mouth every other day    Other orders  -     amLODIPine (NORVASC) 5 MG tablet; Take 1 tablet by mouth daily    Plan: I will see her back in 4 months and as needed. No blood work today but will check everything next time around fasting. Fall precautions. Prescription management performed. Notify us with problems in the interim. No follow-ups on file. Seen By:  Mesha Yoo MD      *Document was created using voice recognition software.   Note was reviewed however may contain grammatical errors.

## 2022-03-02 ENCOUNTER — OFFICE VISIT (OUTPATIENT)
Dept: FAMILY MEDICINE CLINIC | Age: 87
End: 2022-03-02
Payer: MEDICARE

## 2022-03-02 VITALS
SYSTOLIC BLOOD PRESSURE: 130 MMHG | WEIGHT: 154.2 LBS | HEIGHT: 63 IN | RESPIRATION RATE: 16 BRPM | HEART RATE: 80 BPM | OXYGEN SATURATION: 99 % | BODY MASS INDEX: 27.32 KG/M2 | DIASTOLIC BLOOD PRESSURE: 80 MMHG | TEMPERATURE: 97.6 F

## 2022-03-02 DIAGNOSIS — E78.5 HYPERLIPIDEMIA, UNSPECIFIED HYPERLIPIDEMIA TYPE: ICD-10-CM

## 2022-03-02 DIAGNOSIS — N39.46 MIXED STRESS AND URGE URINARY INCONTINENCE: ICD-10-CM

## 2022-03-02 DIAGNOSIS — E53.8 VITAMIN B 12 DEFICIENCY: ICD-10-CM

## 2022-03-02 DIAGNOSIS — F41.9 ANXIETY: ICD-10-CM

## 2022-03-02 DIAGNOSIS — I10 ESSENTIAL HYPERTENSION: ICD-10-CM

## 2022-03-02 DIAGNOSIS — F32.9 CURRENT EPISODE OF MAJOR DEPRESSIVE DISORDER WITHOUT PRIOR EPISODE, UNSPECIFIED DEPRESSION EPISODE SEVERITY: ICD-10-CM

## 2022-03-02 DIAGNOSIS — G47.00 INSOMNIA, UNSPECIFIED TYPE: ICD-10-CM

## 2022-03-02 DIAGNOSIS — I10 ESSENTIAL HYPERTENSION: Primary | ICD-10-CM

## 2022-03-02 LAB
BASOPHILS ABSOLUTE: 0.04 E9/L (ref 0–0.2)
BASOPHILS RELATIVE PERCENT: 0.6 % (ref 0–2)
EOSINOPHILS ABSOLUTE: 0.17 E9/L (ref 0.05–0.5)
EOSINOPHILS RELATIVE PERCENT: 2.6 % (ref 0–6)
HCT VFR BLD CALC: 43.2 % (ref 34–48)
HEMOGLOBIN: 13.1 G/DL (ref 11.5–15.5)
IMMATURE GRANULOCYTES #: 0.02 E9/L
IMMATURE GRANULOCYTES %: 0.3 % (ref 0–5)
LYMPHOCYTES ABSOLUTE: 1.95 E9/L (ref 1.5–4)
LYMPHOCYTES RELATIVE PERCENT: 30.4 % (ref 20–42)
MCH RBC QN AUTO: 27.3 PG (ref 26–35)
MCHC RBC AUTO-ENTMCNC: 30.3 % (ref 32–34.5)
MCV RBC AUTO: 90.2 FL (ref 80–99.9)
MONOCYTES ABSOLUTE: 0.56 E9/L (ref 0.1–0.95)
MONOCYTES RELATIVE PERCENT: 8.7 % (ref 2–12)
NEUTROPHILS ABSOLUTE: 3.68 E9/L (ref 1.8–7.3)
NEUTROPHILS RELATIVE PERCENT: 57.4 % (ref 43–80)
PDW BLD-RTO: 14.2 FL (ref 11.5–15)
PLATELET # BLD: 219 E9/L (ref 130–450)
PMV BLD AUTO: 11 FL (ref 7–12)
RBC # BLD: 4.79 E12/L (ref 3.5–5.5)
VITAMIN B-12: 705 PG/ML (ref 211–946)
WBC # BLD: 6.4 E9/L (ref 4.5–11.5)

## 2022-03-02 PROCEDURE — 99214 OFFICE O/P EST MOD 30 MIN: CPT | Performed by: INTERNAL MEDICINE

## 2022-03-03 ENCOUNTER — TELEPHONE (OUTPATIENT)
Dept: FAMILY MEDICINE CLINIC | Age: 87
End: 2022-03-03

## 2022-03-03 DIAGNOSIS — E78.5 HYPERLIPIDEMIA, UNSPECIFIED HYPERLIPIDEMIA TYPE: Primary | ICD-10-CM

## 2022-03-03 LAB
ALBUMIN SERPL-MCNC: 4.3 G/DL (ref 3.5–5.2)
ALP BLD-CCNC: 81 U/L (ref 35–104)
ALT SERPL-CCNC: 12 U/L (ref 0–32)
ANION GAP SERPL CALCULATED.3IONS-SCNC: 13 MMOL/L (ref 7–16)
AST SERPL-CCNC: 27 U/L (ref 0–31)
BILIRUB SERPL-MCNC: 0.5 MG/DL (ref 0–1.2)
BUN BLDV-MCNC: 17 MG/DL (ref 6–23)
CALCIUM SERPL-MCNC: 9 MG/DL (ref 8.6–10.2)
CHLORIDE BLD-SCNC: 106 MMOL/L (ref 98–107)
CHOLESTEROL, TOTAL: 229 MG/DL (ref 0–199)
CO2: 25 MMOL/L (ref 22–29)
CREAT SERPL-MCNC: 1 MG/DL (ref 0.5–1)
GFR AFRICAN AMERICAN: >60
GFR NON-AFRICAN AMERICAN: 52 ML/MIN/1.73
GLUCOSE BLD-MCNC: 99 MG/DL (ref 74–99)
HDLC SERPL-MCNC: 35 MG/DL
LDL CHOLESTEROL CALCULATED: 154 MG/DL (ref 0–99)
POTASSIUM SERPL-SCNC: 4.7 MMOL/L (ref 3.5–5)
SODIUM BLD-SCNC: 144 MMOL/L (ref 132–146)
TOTAL PROTEIN: 7.1 G/DL (ref 6.4–8.3)
TRIGL SERPL-MCNC: 198 MG/DL (ref 0–149)
VLDLC SERPL CALC-MCNC: 40 MG/DL

## 2022-03-03 NOTE — PROGRESS NOTES
[address] [place]     3/3/22  Nicol Ledesma : 1932 Sex: female  Age: 80 y.o. Chief Complaint   Patient presents with    Other     4 month follow up        HPI    Patient presents today for 4-month follow-up visit on her medical problems. She is accompanied by her  today. She states that she has recently seen GI for loose stool. I do not have a report from them. She apparently was placed on medication which sounds like Carafate but did not take any of her bottles and p.o. to look at. She states she just started the pills yesterday so really has not had much of a trial for response yet. She did wean herself off of her sertraline that we had had her on for depression/anxiety. States she is doing okay off the medication. Still some degree of stress anxiety and depression related to their relatively recent [place] [facility] [place] States they are just doing a little bit better. They also just recently lost her dog of 16 years which is added to the mix. She tells me blood pressures have been good on her current antihypertensive medication. Her lipids have been stable on her current statin medication. No falls. She does continue to follow with urology for her bladder cancer and Botox injections for urinary incontinence.  Also follows with ophthalmology for her macular degeneration and plastics for her skin cancer  Recently saw GI for loose stool    Review of Systems     Const: Denies chills, fever and sweats. ENMT: Denies ear symptoms. Reports postnasal drip, but denies other nasal symptoms. Denies mouth or throat  symptoms. CV: Denies chest pain, orthopnea and palpitations. Resp: Denies cough, SOB and wheezing. GI: Denies diarrhea, nausea and vomiting. : Urinary: reports incontinence, but denies dysuria, frequency and frequent UTI's--she is following with urology.  She has had InterStim placement and Botox treatment in the past. She also has history of bladder  cancer. Musculo: Reports arthritis. Neuro: Reports difficulty with balance but denies dizziness, headache, seizures and syncope/Improved/no longer  following with neurology-symptoms have improved. Psych: Reports anxiety, depression and stress and insomnia-some better. She weaned herself off of sertraline and did not tolerate Remeron.   Still feels she should be on something but she is declined. Does not appear to be suicidal.      REST OF PERTINENT ROS GONE OVER AND WAS NEGATIVE. PMH:  Problem List: Essential hypertension, Benign essential hypertension, Taking medication, Hypothyroidism  Health Maintenance:  Couseled on Home Safety - (2/5/2018)  Mammogram - (5/1/2011)  Mammogram Screening - (5/31/2007)  Colonoscopy - (5/19/2010)  Bone Density Test Screening - (6/19/2008)  Pelvic/Pap Exam - with Dr. Roe Livingston  Rectal Exam - 5/10  Bone Density Scan - 6/06,6/08,9/13,8/17-nl  Duplex Carotid Ultasound - 11/06,3/09  2D ECHO - 6/14  EKG - 12/12,8/13,5/14,10/14,3/16,4/16  Hemmocult Cards - 7/15-neg x 3  Holter monitor - 3/16  Prevnar Vaccine - (7/2015)  Pneumonia Vaccination - (2009)  Tetanus Immunization - (2010)  Zoster/Shingles Vaccine - (2016) [facility]  Influenza Vaccination - (9/2016)  Medical Problems:  Hypertension - . Hypothyroidism - . Incontinence - .bladder interstim/botox  transient ischemic attack - (1/2007) . Abdominal Hernia Left - followed by Dr. Gin Reza  Dysphagia - .   Tinnitus - .  Varicose Veins - tx with sclerotherapy  Arthritis - shoulders; injected by Dr Marilee Monroe - follows with urology  reflex sympathetic dystrophy  Pulmonary Nodule - followed and stable  Sleep Apnea  Hyperlipidemia - statin intolerance  Anxiety, Depression, Osteoarthritis, LLQ spigelian hernia repair, Hiatal Hernia, Lumbar DDD, Lumbar Spinal Stenosis,  Diverticulosis  Valvular Heart Disease - mild  InterStim placement - 3/16  Macular Degeneration  Left carpal tunnel surgery - 2/18  Basal cell skin cancer  Insomnia  Surgical Hx:  Bladder Repair - X4  Ovarian Cyst - . Oophorectomy- - . Hysterectomy, Partial - () . Tonsillectomy W/ Adenoidectomy - (1941) (age 10 Years) . Endoscopy - (2006) . Cataract Removal - bilateral  carpal tunnel release - remotely, right hand  carpal tunnel release, left - (2018) Dr. Linda Boyle: Deja Girard, for reading  OB/Gyn Hx:: (3)Parity: Full term (3)  Reviewed, no changes.                  Current Outpatient Medications:     Coenzyme Q10 (CO Q-10 PO), Take by mouth, Disp: , Rfl:     losartan (COZAAR) 100 MG tablet, Take 1 tablet by mouth daily, Disp: 90 tablet, Rfl: 1    atorvastatin (LIPITOR) 20 MG tablet, Take 1 tablet by mouth every other day, Disp: 45 tablet, Rfl: 1    amLODIPine (NORVASC) 5 MG tablet, Take 1 tablet by mouth daily, Disp: 90 tablet, Rfl: 1    zolpidem (AMBIEN) 5 MG tablet, Take 5 mg by mouth nightly as needed for Sleep., Disp: , Rfl:     Multiple Vitamin (DAILY VALUE MULTIVITAMIN) TABS, MULTIPLE VITAMIN DAILY VALUE MULTIVITAMIN TABS 1 tablet daily  MULTIPLE VITAMIN [phone] Tressa Ribera MD 2020  [address] [place] [address] ([id number]), Disp: , Rfl:     Turmeric (CURCUMIN 95 PO), Take by mouth, Disp: , Rfl:     vitamin D (CHOLECALCIFEROL) 1000 UNIT TABS tablet, Take by mouth, Disp: , Rfl:     vitamin B-12 (CYANOCOBALAMIN) 1000 MCG tablet, Take by mouth, Disp: , Rfl:     magnesium oxide (MAG-OX) 400 MG tablet, Take by mouth, Disp: , Rfl:     sertraline (ZOLOFT) 100 MG tablet, Take 1 tablet by mouth daily (Patient not taking: Reported on 3/2/2022), Disp: 30 tablet, Rfl: 5    triamcinolone (KENALOG) 0.1 % cream, Apply topically 2 times daily.  (Patient not taking: Reported on 3/2/2022), Disp: 45 g, Rfl: 0  No Known Allergies    Past Medical History:   Diagnosis Date    Abdominal hernia     SANKOVIC    Anxiety     Arthritis     shoulders; injected by dr Adrian Kevin Bladder cancer ([facility] [address]     DDD (degenerative disc disease), cervical     lumbar    Depression     Diverticulosis     Dysphagia     Hiatal hernia     Hyperlipidemia     Hypertension     Hypothyroidism     Incontinence     Macular degeneration     Pulmonary nodule     Reflex sympathetic dystrophy     Sleep apnea     Spigelian hernia     Spinal stenosis     lumbar    Tinnitus     Transient ischemic attack 01/2007    Valvular heart disease      Past Surgical History:   Procedure Laterality Date    BLADDER REPAIR      x4    CARPAL TUNNEL RELEASE Right     CARPAL TUNNEL RELEASE Left 02/2018    kylah    CATARACT REMOVAL Bilateral     OVARY REMOVAL      PARTIAL HYSTERECTOMY  1977    TONSILLECTOMY  1941     Family History   Problem Relation Age of Onset    Stroke Mother     Prostate Cancer Father     Lung Cancer Sister     Prostate Cancer Brother     Coronary Art Dis Brother     Pancreatic Cancer Brother     Breast Cancer Maternal Aunt     Cancer Paternal Aunt         ovarian, stomach    Cancer Paternal Uncle     Stroke Maternal Grandmother     Other Son         neuroendocine malignancy    Other Daughter         neuroendocine malignancy     Social History     Socioeconomic History    Marital status:      Spouse name: Not on file    Number of children: Not on file    Years of education: Not on file    Highest education level: Not on file   Occupational History    Not on file   Tobacco Use    Smoking status: Never Smoker    Smokeless tobacco: Never Used   Substance and Sexual Activity    Alcohol use: Yes     Comment: occ    Drug use: Not on file    Sexual activity: Not on file   Other Topics Concern    Not on file   Social History Narrative    Not on file     Social Determinants of Health     Financial Resource Strain: Low Risk     Difficulty of Paying Living Expenses: Not hard at all   Food Insecurity: No Food Insecurity    Worried About Running Out of Food in the Last Year: Never true    Ran Out of Food in the Last Year: Never true   Transportation Needs:     Lack of Transportation (Medical): Not on file    Lack of Transportation (Non-Medical): Not on file   Physical Activity:     Days of Exercise per Week: Not on file    Minutes of Exercise per Session: Not on file   Stress:     Feeling of Stress : Not on file   Social Connections:     Frequency of Communication with Friends and Family: Not on file    Frequency of Social Gatherings with Friends and Family: Not on file    Attends [employer] Services: Not on file    Active Member of [address] [place] [address] "[employer] or Organizations: Not on file    Attends Club or Organization Meetings: Not on file    Marital Status: Not on file   Intimate Partner Violence:     Fear of Current or Ex-Partner: Not on file    Emotionally Abused: Not on file    Physically Abused: Not on file    Sexually Abused: Not on file   Housing Stability:     Unable to Pay for Housing in the Last Year: Not on file    Number of [place] in the Last Year: Not on file    Unstable Housing in the Last Year: Not on file       Vitals:    03/02/22 1248   BP: 130/80   Pulse: 80   Resp: 16   Temp: 97.6 °F (36.4 °C)   TempSrc: Temporal   SpO2: 99%   Weight: 154 lb 3.2 oz (69.9 kg)   Height: 5' 3\" (1.6 m)       Physical Exam    Const: Appears well developed and well nourished. No signs of acute distress present. Neck: Supple and symmetric. Palpation reveals no adenopathy. No masses appreciated. Thyroid exhibits no nodule  or thyromegaly. No JVD. Carotids: 2+ and equal bilaterally, without bruits. Resp: No rales, rhonchi or wheezes appreciated over the lungs bilaterally. CV: Rate is regular. Rhythm is regular/occasional ectopy. S1 is normal. S2 is normal. No gallop or rubs. No heart  murmur appreciated. Extremities: Edema, but no clubbing or cyanosis/trace  Abdomen: Bowel sounds are normoactive. Palpation reveals softness, with no distension, organomegaly or  tenderness.  No abdominal masses palpable. No palpable hepatosplenomegaly. Psych: Patient's attitude is cooperative. affect was okay today. Valeria Winslow is realistic. Insight is appropriate. Neurologically grossly intact without focal deficits noted.              Assessment and Plan:  Laurie Carter was seen today for other. Diagnoses and all orders for this visit:    Essential hypertension  -     Comprehensive Metabolic Panel; Future  -     CBC with Auto Differential; Future  Stable on current antihypertensive medication    Hyperlipidemia, unspecified hyperlipidemia type  -     Lipid Panel; Future  Stable on statin medication which she is tolerating    Anxiety  Ongoing-declines any further medication. Vitamin B 12 deficiency  -     Vitamin B12; Future  Stable on current B12    Current episode of major depressive disorder without prior episode, unspecified depression episode severity  Ongoing but stable-declines any further medication    Insomnia, unspecified type  Ongoing but stable    Mixed stress and urge urinary incontinence  Stable and following with urology. Plan: I will see her back in 4 months and as needed. Will await correspondence from gastroenterology. Follow-up with above consultants. Continue to monitor blood pressure. Blood work to monitor disease progression and medication use. Fall precautions. Notify us of problems in the interim. Return in about 4 months (around 7/2/2022). Seen By:  Lisa Boykin MD      *Document was created using voice recognition software. Note was reviewed however may contain grammatical errors.

## 2022-03-03 NOTE — TELEPHONE ENCOUNTER
Having a hard time believing this lady is taking her statin medication. Please call and see what she is doing.

## 2022-03-09 NOTE — TELEPHONE ENCOUNTER
If there is not any issue with this medication as far as tolerance have her go from every other day to daily and repeat numbers in about 6 weeks.

## 2022-03-09 NOTE — TELEPHONE ENCOUNTER
Left detailed message on patients answering machine as well as asking her to call back to make sure she got the message.

## 2022-03-10 NOTE — TELEPHONE ENCOUNTER
I spoke with the patient. She had not listened to the message from yesterday. She has been notified and verbalize understanding. She will start taking the medication every day.

## 2022-04-01 RX ORDER — AMLODIPINE BESYLATE 5 MG/1
5 TABLET ORAL DAILY
Qty: 90 TABLET | Refills: 1 | Status: SHIPPED
Start: 2022-04-01 | End: 2022-10-26 | Stop reason: SDUPTHER

## 2022-04-01 NOTE — TELEPHONE ENCOUNTER
Last Appointment:  3/2/2022  Future Appointments   Date Time Provider Kandi Gonzalez   6/29/2022 12:45 PM Soraida Olmstead  [address]

## 2022-04-18 ENCOUNTER — OFFICE VISIT (OUTPATIENT)
Dept: PRIMARY CARE CLINIC | Age: 87
End: 2022-04-18
Payer: MEDICARE

## 2022-04-18 VITALS
SYSTOLIC BLOOD PRESSURE: 136 MMHG | OXYGEN SATURATION: 98 % | DIASTOLIC BLOOD PRESSURE: 68 MMHG | HEART RATE: 96 BPM | TEMPERATURE: 98 F | BODY MASS INDEX: 27.21 KG/M2 | WEIGHT: 153.6 LBS

## 2022-04-18 DIAGNOSIS — R68.83 CHILL: ICD-10-CM

## 2022-04-18 DIAGNOSIS — R09.89 CHEST CONGESTION: ICD-10-CM

## 2022-04-18 DIAGNOSIS — R50.9 FEVER, UNSPECIFIED FEVER CAUSE: ICD-10-CM

## 2022-04-18 DIAGNOSIS — R05.9 COUGH: ICD-10-CM

## 2022-04-18 DIAGNOSIS — J06.9 UPPER RESPIRATORY TRACT INFECTION, UNSPECIFIED TYPE: Primary | ICD-10-CM

## 2022-04-18 LAB
INFLUENZA A ANTIGEN, POC: NEGATIVE
INFLUENZA B ANTIGEN, POC: NEGATIVE
Lab: NORMAL
PERFORMING INSTRUMENT: NORMAL
QC PASS/FAIL: NORMAL
SARS-COV-2, POC: NORMAL

## 2022-04-18 PROCEDURE — 99213 OFFICE O/P EST LOW 20 MIN: CPT | Performed by: NURSE PRACTITIONER

## 2022-04-18 PROCEDURE — 87426 SARSCOV CORONAVIRUS AG IA: CPT | Performed by: NURSE PRACTITIONER

## 2022-04-18 PROCEDURE — 87804 INFLUENZA ASSAY W/OPTIC: CPT | Performed by: NURSE PRACTITIONER

## 2022-04-18 RX ORDER — BENZONATATE 200 MG/1
200 CAPSULE ORAL 3 TIMES DAILY PRN
Qty: 30 CAPSULE | Refills: 0 | Status: SHIPPED | OUTPATIENT
Start: 2022-04-18 | End: 2022-04-28

## 2022-04-18 RX ORDER — METHYLPREDNISOLONE 4 MG/1
TABLET ORAL
Qty: 1 KIT | Refills: 0 | Status: SHIPPED | OUTPATIENT
Start: 2022-04-18 | End: 2022-04-24

## 2022-04-18 RX ORDER — MONTELUKAST SODIUM 4 MG/1
TABLET, CHEWABLE ORAL
COMMUNITY
Start: 2022-04-01

## 2022-04-18 RX ORDER — MIRTAZAPINE 30 MG/1
TABLET, FILM COATED ORAL
COMMUNITY
Start: 2022-04-01 | End: 2022-06-29 | Stop reason: SINTOL

## 2022-04-18 RX ORDER — DOXYCYCLINE HYCLATE 100 MG
100 TABLET ORAL 2 TIMES DAILY
Qty: 20 TABLET | Refills: 0 | Status: SHIPPED | OUTPATIENT
Start: 2022-04-18 | End: 2022-04-28

## 2022-04-18 NOTE — PROGRESS NOTES
Chief Complaint   Concern For COVID-19 (fever, chills, prod. yellow cough, diarrhea, st x 3 days  )      History of Present Illness   Source of history provided by:  patient. Ihsan Perry is a 80 y.o. old female who presents to the walk in clinic with complaints of Fever, Chills, Headache, Pharyngitis, Rhinorrhea, Post nasal drip, productive Cough, Diarrhea  and Fatigue x 3 days. States symptoms have worsened, since onset. Has been taking OTC cough suppressant and Advil, without symptomatic relief. Denies any Shortness of breath, Nausea, Vomiting, Chest Pain, LE Edema, Abdominal Pain, Rash or Close contact with a lab confirmed COVID-19 patient within 14 days of symptom onset . Denies any hx of asthma, COPD, emphysema or pneumonia. ROS   Pertinent positives and negatives are stated within HPI, all other systems reviewed and are negative. Past Medical History:  has a past medical history of Abdominal hernia, Anxiety, Arthritis, Bladder cancer ([facility] [address] DDD (degenerative disc disease), cervical, Depression, Diverticulosis, Dysphagia, Hiatal hernia, Hyperlipidemia, Hypertension, Hypothyroidism, Incontinence, Macular degeneration, Pulmonary nodule, Reflex sympathetic dystrophy, Sleep apnea, Spigelian hernia, Spinal stenosis, Tinnitus, Transient ischemic attack, and Valvular heart disease. Past Surgical History:  has a past surgical history that includes bladder repair; Ovary removal; partial hysterectomy (cervix not removed) (1977); Tonsillectomy (1941); Cataract removal (Bilateral); Carpal tunnel release (Right); and Carpal tunnel release (Left, 02/2018). Social History:  reports that she has never smoked. She has never used smokeless tobacco. She reports current alcohol use. Family History: family history includes Breast Cancer in her maternal aunt; Cancer in her paternal aunt and paternal uncle; Coronary Art Dis in her brother; Shelley Ammon in her sister; Other in her daughter and son;  Pancreatic Cancer in her brother; Prostate Cancer in her brother and father; Stroke in her maternal grandmother and mother. Allergies: Patient has no known allergies. Physical Exam   Vital Signs:  /68 (Site: Left Upper Arm, Position: Sitting, Cuff Size: Large Adult)   Pulse 96   Temp 98 °F (36.7 °C)   Wt 153 lb 9.6 oz (69.7 kg)   SpO2 98%   BMI 27.21 kg/m²    Oxygen Saturation Interpretation: Normal.    Constitutional:  Alert, development consistent with age. NAD. Moderate amount of swelling noted to bilateral nares. Head:  NC/NT. Airway patent. Ears: TMs clear bilaterally. Canals without exudate or swelling bilaterally. Mouth: Posterior pharynx with mild erythema and clear postnasal drip. No tonsillar hypertrophy or exudate. Neck:  Normal ROM. Supple. No anterior cervical adenopathy noted. Lungs: Moderate congestion noted to all lung fields. Deep cough noted during exam.  CV:  Regular rate and rhythm, normal heart sounds, without pathological murmurs, ectopy, gallops, or rubs. Skin:  Normal turgor. Warm, dry, without visible rash. Lymphatic: No lymphangitis or adenopathy noted. Neurological:  Oriented. Motor functions intact. Lab / Imaging Results   (All laboratory and radiology results have been personally reviewed by myself)  Labs:  Results for orders placed or performed in visit on 04/18/22   POCT COVID-19, Antigen   Result Value Ref Range    SARS-COV-2, POC Not-Detected Not Detected    Lot Number [id number]     QC Pass/Fail pass     Performing Instrument BD Veritor    POCT Influenza A/B Antigen (BD Veritor)   Result Value Ref Range    Inflenza A Ag negative     Influenza B Ag negative        Imaging: All Radiology results interpreted by Radiologist unless otherwise noted. No results found. Medical Decision Making   Pt non-toxic, in no apparent distress and stable at time of discharge. Assessment/Plan   Brittney Alvarez was seen today for concern for covid-19.     Diagnoses and all orders for this visit:    Upper respiratory tract infection, unspecified type  -     doxycycline hyclate (VIBRA-TABS) 100 MG tablet; Take 1 tablet by mouth 2 times daily for 10 days    Cough  -     POCT COVID-19, Antigen  -     POCT Influenza A/B Antigen (BD Veritor)  -     benzonatate (TESSALON) 200 MG capsule; Take 1 capsule by mouth 3 times daily as needed for Cough    Chest congestion  -     methylPREDNISolone (MEDROL DOSEPACK) 4 MG tablet; Take by mouth. Fever, unspecified fever cause  -     POCT COVID-19, Antigen  -     POCT Influenza A/B Antigen (BD Veritor)    Chill  -     POCT COVID-19, Antigen  -     POCT Influenza A/B Antigen (BD Veritor)        Rapid COVID-19 and influenza A/B swab obtained and noted to be negative. Increase fluids and rest. Symptomatic relief discussed including Tylenol prn pain/fever. Schedule f/u with PCP in 7-10 days if symptoms persist. ED sooner if symptoms worsen or change. ED immediately with high or refractory fever, progressive SOB, dyspnea, CP, calf pain/swelling, shaking chills, vomiting, abdominal pain, lethargy, flank pain, or decreased urinary output. Pt verbalizes understanding and is in agreement with plan of care. All questions answered. ANGEL LUIS Barrera NP    This visit was provided as a focused evaluation during the [place] -19 pandemic/national emergency. A comprehensive review of all previous patient history and testing was not conducted. Pertinent findings were elicited during the visit. *NOTE: This report was transcribed using voice recognition software. Every effort was made to ensure accuracy; however, inadvertent computerized transcription errors may be present.

## 2022-06-03 DIAGNOSIS — E78.5 HYPERLIPIDEMIA, UNSPECIFIED HYPERLIPIDEMIA TYPE: ICD-10-CM

## 2022-06-03 DIAGNOSIS — I10 ESSENTIAL HYPERTENSION: ICD-10-CM

## 2022-06-03 RX ORDER — ATORVASTATIN CALCIUM 20 MG/1
20 TABLET, FILM COATED ORAL EVERY OTHER DAY
Qty: 45 TABLET | Refills: 1 | Status: SHIPPED
Start: 2022-06-03 | End: 2022-06-29 | Stop reason: SDUPTHER

## 2022-06-03 RX ORDER — LOSARTAN POTASSIUM 100 MG/1
100 TABLET ORAL DAILY
Qty: 90 TABLET | Refills: 1 | Status: SHIPPED
Start: 2022-06-03 | End: 2022-10-26 | Stop reason: SDUPTHER

## 2022-06-29 ENCOUNTER — OFFICE VISIT (OUTPATIENT)
Dept: FAMILY MEDICINE CLINIC | Age: 87
End: 2022-06-29
Payer: MEDICARE

## 2022-06-29 ENCOUNTER — OFFICE VISIT (OUTPATIENT)
Dept: FAMILY MEDICINE CLINIC | Age: 87
End: 2022-06-29

## 2022-06-29 VITALS
HEIGHT: 63 IN | HEART RATE: 93 BPM | WEIGHT: 153.8 LBS | TEMPERATURE: 97.9 F | SYSTOLIC BLOOD PRESSURE: 136 MMHG | OXYGEN SATURATION: 96 % | DIASTOLIC BLOOD PRESSURE: 86 MMHG | BODY MASS INDEX: 27.25 KG/M2

## 2022-06-29 VITALS
WEIGHT: 153.8 LBS | SYSTOLIC BLOOD PRESSURE: 136 MMHG | TEMPERATURE: 97.9 F | HEIGHT: 63 IN | BODY MASS INDEX: 27.25 KG/M2 | OXYGEN SATURATION: 96 % | DIASTOLIC BLOOD PRESSURE: 86 MMHG | HEART RATE: 93 BPM

## 2022-06-29 DIAGNOSIS — I10 ESSENTIAL HYPERTENSION: ICD-10-CM

## 2022-06-29 DIAGNOSIS — E78.5 HYPERLIPIDEMIA, UNSPECIFIED HYPERLIPIDEMIA TYPE: ICD-10-CM

## 2022-06-29 DIAGNOSIS — G47.00 INSOMNIA, UNSPECIFIED TYPE: ICD-10-CM

## 2022-06-29 DIAGNOSIS — C67.9 MALIGNANT NEOPLASM OF URINARY BLADDER, UNSPECIFIED SITE (HCC): ICD-10-CM

## 2022-06-29 DIAGNOSIS — F41.9 ANXIETY: ICD-10-CM

## 2022-06-29 DIAGNOSIS — N39.46 MIXED STRESS AND URGE URINARY INCONTINENCE: ICD-10-CM

## 2022-06-29 DIAGNOSIS — Z00.00 MEDICARE ANNUAL WELLNESS VISIT, SUBSEQUENT: Primary | ICD-10-CM

## 2022-06-29 DIAGNOSIS — R19.7 DIARRHEA, UNSPECIFIED TYPE: Primary | ICD-10-CM

## 2022-06-29 LAB
ALBUMIN SERPL-MCNC: 4.4 G/DL (ref 3.5–5.2)
ALP BLD-CCNC: 79 U/L (ref 35–104)
ALT SERPL-CCNC: 12 U/L (ref 0–32)
ANION GAP SERPL CALCULATED.3IONS-SCNC: 8 MMOL/L (ref 7–16)
AST SERPL-CCNC: 23 U/L (ref 0–31)
BILIRUB SERPL-MCNC: 0.9 MG/DL (ref 0–1.2)
BUN BLDV-MCNC: 19 MG/DL (ref 6–23)
CALCIUM SERPL-MCNC: 9.2 MG/DL (ref 8.6–10.2)
CHLORIDE BLD-SCNC: 104 MMOL/L (ref 98–107)
CHOLESTEROL, TOTAL: 174 MG/DL (ref 0–199)
CO2: 29 MMOL/L (ref 22–29)
CREAT SERPL-MCNC: 0.9 MG/DL (ref 0.5–1)
GFR AFRICAN AMERICAN: >60
GFR NON-AFRICAN AMERICAN: 59 ML/MIN/1.73
GLUCOSE BLD-MCNC: 94 MG/DL (ref 74–99)
HDLC SERPL-MCNC: 42 MG/DL
LDL CHOLESTEROL CALCULATED: 100 MG/DL (ref 0–99)
POTASSIUM SERPL-SCNC: 4.2 MMOL/L (ref 3.5–5)
SODIUM BLD-SCNC: 141 MMOL/L (ref 132–146)
TOTAL PROTEIN: 7 G/DL (ref 6.4–8.3)
TRIGL SERPL-MCNC: 161 MG/DL (ref 0–149)
VLDLC SERPL CALC-MCNC: 32 MG/DL

## 2022-06-29 PROCEDURE — G0439 PPPS, SUBSEQ VISIT: HCPCS | Performed by: INTERNAL MEDICINE

## 2022-06-29 PROCEDURE — 99214 OFFICE O/P EST MOD 30 MIN: CPT | Performed by: INTERNAL MEDICINE

## 2022-06-29 PROCEDURE — 1123F ACP DISCUSS/DSCN MKR DOCD: CPT | Performed by: INTERNAL MEDICINE

## 2022-06-29 RX ORDER — ATORVASTATIN CALCIUM 20 MG/1
20 TABLET, FILM COATED ORAL DAILY
Qty: 90 TABLET | Refills: 1 | Status: SHIPPED
Start: 2022-06-29 | End: 2022-10-26 | Stop reason: SDUPTHER

## 2022-06-29 SDOH — HEALTH STABILITY: PHYSICAL HEALTH: ON AVERAGE, HOW MANY MINUTES DO YOU ENGAGE IN EXERCISE AT THIS LEVEL?: 0 MIN

## 2022-06-29 SDOH — HEALTH STABILITY: PHYSICAL HEALTH: ON AVERAGE, HOW MANY DAYS PER WEEK DO YOU ENGAGE IN MODERATE TO STRENUOUS EXERCISE (LIKE A BRISK WALK)?: 0 DAYS

## 2022-06-29 ASSESSMENT — PATIENT HEALTH QUESTIONNAIRE - PHQ9
4. FEELING TIRED OR HAVING LITTLE ENERGY: 0
2. FEELING DOWN, DEPRESSED OR HOPELESS: 0
3. TROUBLE FALLING OR STAYING ASLEEP: 0
9. THOUGHTS THAT YOU WOULD BE BETTER OFF DEAD, OR OF HURTING YOURSELF: 0
SUM OF ALL RESPONSES TO PHQ QUESTIONS 1-9: 0
7. TROUBLE CONCENTRATING ON THINGS, SUCH AS READING THE NEWSPAPER OR WATCHING TELEVISION: 0
SUM OF ALL RESPONSES TO PHQ QUESTIONS 1-9: 0
2. FEELING DOWN, DEPRESSED OR HOPELESS: 0
SUM OF ALL RESPONSES TO PHQ QUESTIONS 1-9: 0
SUM OF ALL RESPONSES TO PHQ QUESTIONS 1-9: 0
8. MOVING OR SPEAKING SO SLOWLY THAT OTHER PEOPLE COULD HAVE NOTICED. OR THE OPPOSITE, BEING SO FIGETY OR RESTLESS THAT YOU HAVE BEEN MOVING AROUND A LOT MORE THAN USUAL: 0
SUM OF ALL RESPONSES TO PHQ QUESTIONS 1-9: 0
5. POOR APPETITE OR OVEREATING: 0
10. IF YOU CHECKED OFF ANY PROBLEMS, HOW DIFFICULT HAVE THESE PROBLEMS MADE IT FOR YOU TO DO YOUR WORK, TAKE CARE OF THINGS AT HOME, OR GET ALONG WITH OTHER PEOPLE: 0
1. LITTLE INTEREST OR PLEASURE IN DOING THINGS: 0
1. LITTLE INTEREST OR PLEASURE IN DOING THINGS: 0
SUM OF ALL RESPONSES TO PHQ9 QUESTIONS 1 & 2: 0
SUM OF ALL RESPONSES TO PHQ QUESTIONS 1-9: 0
SUM OF ALL RESPONSES TO PHQ9 QUESTIONS 1 & 2: 0
6. FEELING BAD ABOUT YOURSELF - OR THAT YOU ARE A FAILURE OR HAVE LET YOURSELF OR YOUR FAMILY DOWN: 0

## 2022-06-29 ASSESSMENT — LIFESTYLE VARIABLES
HOW MANY STANDARD DRINKS CONTAINING ALCOHOL DO YOU HAVE ON A TYPICAL DAY: 1 OR 2
HOW OFTEN DO YOU HAVE A DRINK CONTAINING ALCOHOL: NEVER
HOW OFTEN DO YOU HAVE SIX OR MORE DRINKS ON ONE OCCASION: 1
HOW OFTEN DO YOU HAVE A DRINK CONTAINING ALCOHOL: 1
HOW MANY STANDARD DRINKS CONTAINING ALCOHOL DO YOU HAVE ON A TYPICAL DAY: 1

## 2022-06-29 NOTE — PATIENT INSTRUCTIONS
Personalized Preventive Plan for Calvin Romo - 6/29/2022  Medicare offers a range of preventive health benefits. Some of the tests and screenings are paid in full while other may be subject to a deductible, co-insurance, and/or copay. Some of these benefits include a comprehensive review of your medical history including lifestyle, illnesses that may run in your family, and various assessments and screenings as appropriate. After reviewing your medical record and screening and assessments performed today your provider may have ordered immunizations, labs, imaging, and/or referrals for you. A list of these orders (if applicable) as well as your Preventive Care list are included within your After Visit Summary for your review. Other Preventive Recommendations:    · A preventive eye exam performed by an eye specialist is recommended every 1-2 years to screen for glaucoma; cataracts, macular degeneration, and other eye disorders. · A preventive dental visit is recommended every 6 months. · Try to get at least 150 minutes of exercise per week or 10,000 steps per day on a pedometer . · Order or download the FREE \"Exercise & Physical Activity: Your Everyday Guide\" from [employer]. Call 5-526.367.8605 or search [employer] online. · You need 8633-5466 mg of calcium and 7435-2673 IU of vitamin D per day. It is possible to meet your calcium requirement with diet alone, but a vitamin D supplement is usually necessary to meet this goal.  · When exposed to the sun, use a sunscreen that protects against both UVA and UVB radiation with an SPF of 30 or greater. Reapply every 2 to 3 hours or after sweating, drying off with a towel, or swimming. · Always wear a seat belt when traveling in a car. Always wear a helmet when riding a bicycle or motorcycle. (4) no limitation

## 2022-06-29 NOTE — PROGRESS NOTES
Medicare Annual Wellness Visit    Jed Posadas is here for Medicare AWV    Assessment & Plan   Medicare annual wellness visit, subsequent      Recommendations for Preventive Services Due: see orders and patient instructions/AVS.  Recommended screening schedule for the next 5-10 years is provided to the patient in written form: see Patient Instructions/AVS.     Return in 1 year (on 6/29/2023) for Medicare Annual Wellness Visit in 1 year. Subjective   The following acute and/or chronic problems were also addressed today:  Pt had separate regular visit today to address her chronic med problems    Patient's complete Health Risk Assessment and screening values have been reviewed and are found in Flowsheets. The following problems were reviewed today and where indicated follow up appointments were made and/or referrals ordered.     Positive Risk Factor Screenings with Interventions:               General Health and ACP:  General  In general, how would you say your health is?: Good  In the past 7 days, have you experienced any of the following: New or Increased Pain, New or Increased Fatigue, Loneliness, Social Isolation, Stress or Anger?: No  Do you get the social and emotional support that you need?: Yes  Do you have a Living Will?: Yes    Advance Directives     Power of  Living Will ACP-Advance Directive ACP-Power of     Not on File Not on File Not on File Not on File      General Health Risk Interventions:  · no current issues found    Health Habits/Nutrition:     Physical Activity: Inactive    Days of Exercise per Week: 0 days    Minutes of Exercise per Session: 0 min     Have you lost any weight without trying in the past 3 months?: No  Body mass index: (!) 27.24  Have you seen the dentist within the past year?: Yes    Health Habits/Nutrition Interventions:  · no current issues found             Objective   Vitals:    06/29/22 1253 06/29/22 1315   BP: (!) 142/80 136/86   Pulse: 93    Temp: 97.9 °F (36.6 °C)    TempSrc: Temporal    SpO2: 96%    Weight: 153 lb 12.8 oz (69.8 kg)    Height: 5' 3\" (1.6 m)       Body mass index is 27.24 kg/m². No Known Allergies  Prior to Visit Medications    Medication Sig Taking? Authorizing Provider   atorvastatin (LIPITOR) 20 MG tablet Take 1 tablet by mouth daily  Melissa Rothman MD   losartan (COZAAR) 100 MG tablet Take 1 tablet by mouth daily  Melissa Rothman MD   colestipol (COLESTID) 1 g tablet TAKE 2 TO 4 TABLETS BY MOUTH DAILY AS NEEDED  Historical Provider, MD   amLODIPine (NORVASC) 5 MG tablet TAKE 1 TABLET BY MOUTH DAILY  Melissa Rothman MD   Coenzyme Q10 (CO Q-10 PO) Take by mouth  Historical Provider, MD   zolpidem (AMBIEN) 5 MG tablet Take 5 mg by mouth nightly as needed for Sleep.   Historical Provider, MD   Multiple Vitamin (DAILY VALUE MULTIVITAMIN) TABS MULTIPLE VITAMIN DAILY VALUE MULTIVITAMIN TABS 1 tablet daily 2020/12/04 MULTIPLE VITAMIN [phone] Lottie Trivedi MD [address]  [address] [place] [address] ([id number])  Historical Provider, MD   Turmeric (CURCUMIN 95 PO) Take by mouth  Historical Provider, MD   vitamin D (CHOLECALCIFEROL) 1000 UNIT TABS tablet Take by mouth  Historical Provider, MD   vitamin B-12 (CYANOCOBALAMIN) 1000 MCG tablet Take by mouth  Historical Provider, MD   magnesium oxide (MAG-OX) 400 MG tablet Take by mouth  Historical Provider, MD Mendoza (Including outside providers/suppliers regularly involved in providing care):   Patient Care Team:  Melissa Rothman MD as PCP - General (Internal Medicine)  Melissa Rothman MD as PCP - [facility] Empaneled Provider     Reviewed and updated this visit:

## 2022-06-30 ENCOUNTER — TELEPHONE (OUTPATIENT)
Dept: FAMILY MEDICINE CLINIC | Age: 87
End: 2022-06-30

## 2022-06-30 NOTE — PROGRESS NOTES
[facility]     22  Renita Pod : 1932 Sex: female  Age: 80 y.o. Chief Complaint   Patient presents with    Hypertension     4 months    Hyperlipidemia       HPI    Patient presents today for 4-month follow-up visit accompanied by her . Since I seen her last she has seen gastroenterology reviewed their note related to her diarrhea and the starting of colestipol which seems to have helped. Given her last labs I also increased her atorvastatin from every other day to daily which she seems to be tolerating. She never did follow-up with repeat fasting blood work however I will check some of that today. Her blood pressures been stable with numbers that she reports is good on her current antihypertensive medication. She remains off of her antidepressant medication which she was not tolerating. She is doing better from that standpoint. The concern of hers is her macular degeneration and poor eyesight. She does continue to follow with urology for her bladder cancer and Botox injections for urinary incontinence.  Also follows with ophthalmology for her macular degeneration and plastics for her skin cancer  Recently saw GI for loose stool       Review of Systems     Const: Denies chills, fever and sweats. ENMT: Denies ear symptoms. Reports postnasal drip, but denies other nasal symptoms. Denies mouth or throat  symptoms. CV: Denies chest pain, orthopnea and palpitations. Resp: Denies cough, SOB and wheezing. GI: Denies diarrhea, nausea and vomiting. : Urinary: reports incontinence, but denies dysuria, frequency and frequent UTI's--she is following with urology. She has had InterStim placement and Botox treatment in the past. She also has history of bladder  cancer. Musculo: Reports arthritis. Neuro: Reports difficulty with balance but denies dizziness, headache, seizures and syncope/Improved/no longer  following with neurology-symptoms have improved.   Psych: Reports anxiety, depression and stress and insomnia-some better. She weaned herself off of sertraline and did not tolerate Remeron.   Still feels she should be on something but she is declined. Does not appear to be suicidal.        REST OF PERTINENT ROS GONE OVER AND WAS NEGATIVE. PMH:  Problem List: Essential hypertension, Benign essential hypertension, Taking medication, Hypothyroidism  Health Maintenance:  Couseled on Home Safety - (2/5/2018)  Mammogram - (5/1/2011)  Mammogram Screening - (5/31/2007)  Colonoscopy - (5/19/2010)  Bone Density Test Screening - (6/19/2008)  Pelvic/Pap Exam - with Dr. Shari Krabbe  Rectal Exam - 5/10  Bone Density Scan - 6/06,6/08,9/13,8/17-nl  Duplex Carotid Ultasound - 11/06,3/09  2D ECHO - 6/14  EKG - 12/12,8/13,5/14,10/14,3/16,4/16  Hemmocult Cards - 7/15-neg x 3  Holter monitor - 3/16  Prevnar Vaccine - (7/2015)  Pneumonia Vaccination - (2009)  Tetanus Immunization - (2010)  Zoster/Shingles Vaccine - (2016) [facility]  Influenza Vaccination - (9/2016)  Medical Problems:  Hypertension - . Hypothyroidism - . Incontinence - .bladder interstim/botox  transient ischemic attack - (1/2007) . Abdominal Hernia Left - followed by Dr. Krystal Zhang  Dysphagia - . Tinnitus - .  Varicose Veins - tx with sclerotherapy  Arthritis - shoulders; injected by Dr Olivia Almonte - follows with urology  reflex sympathetic dystrophy  Pulmonary Nodule - followed and stable  Sleep Apnea  Hyperlipidemia - statin intolerance  Anxiety, Depression, Osteoarthritis, LLQ spigelian hernia repair, Hiatal Hernia, Lumbar DDD, Lumbar Spinal Stenosis,  Diverticulosis  Valvular Heart Disease - mild  InterStim placement - 3/16  Macular Degeneration  Left carpal tunnel surgery - 2/18  Basal cell skin cancer  Insomnia  Surgical Hx:  Bladder Repair - X4  Ovarian Cyst - . Oophorectomy- - . Hysterectomy, Partial - (1977) . Tonsillectomy W/ Adenoidectomy - (1941) (age 10 Years) . Endoscopy - (6/29/2006) .   Cataract Removal - bilateral  carpal tunnel release - remotely, right hand  carpal tunnel release, left - (2018) Dr. Deana Terrell: Mary Obando, for reading  OB/Gyn Hx:: (3)Parity: Full term (3)  Reviewed, no changes.                  Current Outpatient Medications:     atorvastatin (LIPITOR) 20 MG tablet, Take 1 tablet by mouth daily, Disp: 90 tablet, Rfl: 1    losartan (COZAAR) 100 MG tablet, Take 1 tablet by mouth daily, Disp: 90 tablet, Rfl: 1    colestipol (COLESTID) 1 g tablet, TAKE 2 TO 4 TABLETS BY MOUTH DAILY AS NEEDED, Disp: , Rfl:     amLODIPine (NORVASC) 5 MG tablet, TAKE 1 TABLET BY MOUTH DAILY, Disp: 90 tablet, Rfl: 1    Coenzyme Q10 (CO Q-10 PO), Take by mouth, Disp: , Rfl:     zolpidem (AMBIEN) 5 MG tablet, Take 5 mg by mouth nightly as needed for Sleep., Disp: , Rfl:     Multiple Vitamin (DAILY VALUE MULTIVITAMIN) TABS, MULTIPLE VITAMIN DAILY VALUE MULTIVITAMIN TABS 1 tablet daily  MULTIPLE VITAMIN [id number] Norman Pimentel MD 2020  [address] [place] [address] ([id number]), Disp: , Rfl:     Turmeric (CURCUMIN 95 PO), Take by mouth, Disp: , Rfl:     vitamin D (CHOLECALCIFEROL) 1000 UNIT TABS tablet, Take by mouth, Disp: , Rfl:     vitamin B-12 (CYANOCOBALAMIN) 1000 MCG tablet, Take by mouth, Disp: , Rfl:     magnesium oxide (MAG-OX) 400 MG tablet, Take by mouth, Disp: , Rfl:   No Known Allergies    Past Medical History:   Diagnosis Date    Abdominal hernia     SANKOVIC    Anxiety     Arthritis     shoulders; injected by dr Brigida Moraes    Bladder [facility])     DDD (degenerative disc disease), cervical     lumbar    Depression     Diverticulosis     Dysphagia     Hiatal hernia     Hyperlipidemia     Hypertension     Hypothyroidism     Incontinence     Macular degeneration     Pulmonary nodule     Reflex sympathetic dystrophy     Sleep apnea     Spigelian hernia     Spinal stenosis     lumbar    Tinnitus     Transient ischemic attack 01/2007    Valvular heart disease      Past Surgical History:   Procedure Laterality Date    BLADDER REPAIR      x4    CARPAL TUNNEL RELEASE Right     CARPAL TUNNEL RELEASE Left 02/2018    kylah    CATARACT REMOVAL Bilateral     OVARY REMOVAL      PARTIAL HYSTERECTOMY (CERVIX NOT REMOVED)  1977    TONSILLECTOMY  1941     Family History   Problem Relation Age of Onset    Stroke Mother     Prostate Cancer Father     Lung Cancer Sister     Prostate Cancer Brother     Coronary Art Dis Brother     Pancreatic Cancer Brother     Breast Cancer Maternal Aunt     Cancer Paternal Aunt         ovarian, stomach    Cancer Paternal Uncle     Stroke Maternal Grandmother     Other Son         neuroendocine malignancy    Other Daughter         neuroendocine malignancy     Social History     Socioeconomic History    Marital status:      Spouse name: Not on file    Number of children: Not on file    Years of education: Not on file    Highest education level: Not on file   Occupational History    Not on file   Tobacco Use    Smoking status: Never Smoker    Smokeless tobacco: Never Used   Substance and Sexual Activity    Alcohol use: Yes     Comment: occ    Drug use: Not on file    Sexual activity: Not on file   Other Topics Concern    Not on file   Social History Narrative    Not on file     Social Determinants of Health     Financial Resource Strain: Low Risk     Difficulty of Paying Living Expenses: Not hard at all   Food Insecurity: No Food Insecurity    Worried About Running Out of Food in the Last Year: Never true    [address] in the Last Year: Never true   Transportation Needs:     Lack of Transportation (Medical): Not on file    Lack of Transportation (Non-Medical):  Not on file   Physical Activity: Inactive    Days of Exercise per Week: 0 days    Minutes of Exercise per Session: 0 min   Stress:     Feeling of Stress : Not on file   Social Connections:     Frequency of Communication with Friends and Family: Not on file    Frequency of Social Gatherings with Friends and Family: Not on file    Attends Sikh Services: Not on file    Active Member of Clubs or Organizations: Not on file    Attends Club or Organization Meetings: Not on file    Marital Status: Not on file   Intimate Partner Violence:     Fear of Current or Ex-Partner: Not on file    Emotionally Abused: Not on file    Physically Abused: Not on file    Sexually Abused: Not on file   Housing Stability:     Unable to Pay for Housing in the Last Year: Not on file    Number of [place] in the Last Year: Not on file    Unstable Housing in the Last Year: Not on file       Vitals:    06/29/22 1239 06/29/22 1316   BP: (!) 142/80 136/86   Pulse: 93    Temp: 97.9 °F (36.6 °C)    TempSrc: Temporal    SpO2: 96%    Weight: 153 lb 12.8 oz (69.8 kg)    Height: 5' 3\" (1.6 m)        Physical Exam    Const: Appears well developed and well nourished. No signs of acute distress present. Neck: Supple and symmetric. Palpation reveals no adenopathy. No masses appreciated. Thyroid exhibits no nodule  or thyromegaly. No JVD. Carotids: 2+ and equal bilaterally, without bruits. Resp: No rales, rhonchi or wheezes appreciated over the lungs bilaterally. CV: Rate is regular. Rhythm is regular/occasional ectopy. S1 is normal. S2 is normal. No gallop or rubs. No heart  murmur appreciated. Extremities: Edema, but no clubbing or cyanosis/trace  Abdomen: Bowel sounds are normoactive. Palpation reveals softness, with no distension, organomegaly or  tenderness. No abdominal masses palpable. No palpable hepatosplenomegaly. Psych: Patient's attitude is cooperative. affect was okay today. Linzie Ing is realistic. Insight is appropriate. Neurologically grossly intact without focal deficits noted.                 Assessment and Plan:  Alex Posadas was seen today for hypertension and hyperlipidemia.     Diagnoses and all orders for this visit:    Diarrhea, unspecified type  Stable and controlled on Colestid. Hyperlipidemia, unspecified hyperlipidemia type  -     atorvastatin (LIPITOR) 20 MG tablet; Take 1 tablet by mouth daily  -     Lipid Panel; Future  Suddenly poorly controlled but increased dose of medication and now we will recheck numbers    Essential hypertension  -     Comprehensive Metabolic Panel; Future  Stable and controlled on her antihypertensive medicine    Anxiety  Stable    Insomnia, unspecified type  onGoing    Mixed stress and urge urinary incontinence  Following with urology    Malignant neoplasm of urinary bladder, unspecified site ([address]  Stable and following with urology    Plan: I will see her back in 4 months and as needed. Blood work to monitor disease progression and medication use. Reviewed gastroenterology notes. If she management performed after review of efficacy of medications on her current medical problems. Follow-up with above consultants as directed. Notify us with problems in the interim. Return in about 4 months (around 10/29/2022). Seen By:  Crow Galeano MD      *Document was created using voice recognition software. Note was reviewed however may contain grammatical errors.

## 2022-09-12 RX ORDER — AMLODIPINE BESYLATE 5 MG/1
5 TABLET ORAL DAILY
Qty: 90 TABLET | Refills: 1 | OUTPATIENT
Start: 2022-09-12

## 2022-10-26 ENCOUNTER — OFFICE VISIT (OUTPATIENT)
Dept: FAMILY MEDICINE CLINIC | Age: 87
End: 2022-10-26
Payer: MEDICARE

## 2022-10-26 VITALS
SYSTOLIC BLOOD PRESSURE: 112 MMHG | HEIGHT: 63 IN | OXYGEN SATURATION: 95 % | TEMPERATURE: 98 F | WEIGHT: 153.4 LBS | DIASTOLIC BLOOD PRESSURE: 66 MMHG | HEART RATE: 89 BPM | BODY MASS INDEX: 27.18 KG/M2

## 2022-10-26 DIAGNOSIS — R53.83 FATIGUE, UNSPECIFIED TYPE: Primary | ICD-10-CM

## 2022-10-26 DIAGNOSIS — E78.5 HYPERLIPIDEMIA, UNSPECIFIED HYPERLIPIDEMIA TYPE: ICD-10-CM

## 2022-10-26 DIAGNOSIS — I10 ESSENTIAL HYPERTENSION: ICD-10-CM

## 2022-10-26 DIAGNOSIS — F32.9 MAJOR DEPRESSIVE DISORDER, REMISSION STATUS UNSPECIFIED, UNSPECIFIED WHETHER RECURRENT: ICD-10-CM

## 2022-10-26 DIAGNOSIS — R53.83 FATIGUE, UNSPECIFIED TYPE: ICD-10-CM

## 2022-10-26 DIAGNOSIS — F41.9 ANXIETY: ICD-10-CM

## 2022-10-26 DIAGNOSIS — G47.00 INSOMNIA, UNSPECIFIED TYPE: ICD-10-CM

## 2022-10-26 LAB
ALBUMIN SERPL-MCNC: 4.2 G/DL (ref 3.5–5.2)
ALP BLD-CCNC: 82 U/L (ref 35–104)
ALT SERPL-CCNC: 13 U/L (ref 0–32)
ANION GAP SERPL CALCULATED.3IONS-SCNC: 14 MMOL/L (ref 7–16)
AST SERPL-CCNC: 31 U/L (ref 0–31)
BASOPHILS ABSOLUTE: 0.03 E9/L (ref 0–0.2)
BASOPHILS RELATIVE PERCENT: 0.5 % (ref 0–2)
BILIRUB SERPL-MCNC: 1 MG/DL (ref 0–1.2)
BUN BLDV-MCNC: 25 MG/DL (ref 6–23)
CALCIUM SERPL-MCNC: 9.7 MG/DL (ref 8.6–10.2)
CHLORIDE BLD-SCNC: 105 MMOL/L (ref 98–107)
CO2: 24 MMOL/L (ref 22–29)
CREAT SERPL-MCNC: 1 MG/DL (ref 0.5–1)
EOSINOPHILS ABSOLUTE: 0.13 E9/L (ref 0.05–0.5)
EOSINOPHILS RELATIVE PERCENT: 2.1 % (ref 0–6)
GFR SERPL CREATININE-BSD FRML MDRD: 53 ML/MIN/1.73
GLUCOSE BLD-MCNC: 99 MG/DL (ref 74–99)
HCT VFR BLD CALC: 43.1 % (ref 34–48)
HEMOGLOBIN: 13.3 G/DL (ref 11.5–15.5)
IMMATURE GRANULOCYTES #: 0.02 E9/L
IMMATURE GRANULOCYTES %: 0.3 % (ref 0–5)
LYMPHOCYTES ABSOLUTE: 1.93 E9/L (ref 1.5–4)
LYMPHOCYTES RELATIVE PERCENT: 30.8 % (ref 20–42)
MCH RBC QN AUTO: 27.7 PG (ref 26–35)
MCHC RBC AUTO-ENTMCNC: 30.9 % (ref 32–34.5)
MCV RBC AUTO: 89.8 FL (ref 80–99.9)
MONOCYTES ABSOLUTE: 0.55 E9/L (ref 0.1–0.95)
MONOCYTES RELATIVE PERCENT: 8.8 % (ref 2–12)
NEUTROPHILS ABSOLUTE: 3.61 E9/L (ref 1.8–7.3)
NEUTROPHILS RELATIVE PERCENT: 57.5 % (ref 43–80)
PDW BLD-RTO: 13.7 FL (ref 11.5–15)
PLATELET # BLD: 244 E9/L (ref 130–450)
PMV BLD AUTO: 11 FL (ref 7–12)
POTASSIUM SERPL-SCNC: 4.6 MMOL/L (ref 3.5–5)
RBC # BLD: 4.8 E12/L (ref 3.5–5.5)
SODIUM BLD-SCNC: 143 MMOL/L (ref 132–146)
TOTAL PROTEIN: 7.1 G/DL (ref 6.4–8.3)
TSH SERPL DL<=0.05 MIU/L-ACNC: 2.31 UIU/ML (ref 0.27–4.2)
WBC # BLD: 6.3 E9/L (ref 4.5–11.5)

## 2022-10-26 PROCEDURE — 99214 OFFICE O/P EST MOD 30 MIN: CPT | Performed by: INTERNAL MEDICINE

## 2022-10-26 PROCEDURE — 1123F ACP DISCUSS/DSCN MKR DOCD: CPT | Performed by: INTERNAL MEDICINE

## 2022-10-26 RX ORDER — LOSARTAN POTASSIUM 100 MG/1
100 TABLET ORAL DAILY
Qty: 90 TABLET | Refills: 1 | Status: SHIPPED | OUTPATIENT
Start: 2022-10-26

## 2022-10-26 RX ORDER — ATORVASTATIN CALCIUM 20 MG/1
20 TABLET, FILM COATED ORAL DAILY
Qty: 90 TABLET | Refills: 1 | Status: SHIPPED | OUTPATIENT
Start: 2022-10-26

## 2022-10-26 RX ORDER — TRAZODONE HYDROCHLORIDE 50 MG/1
TABLET ORAL
Qty: 30 TABLET | Refills: 3 | Status: SHIPPED | OUTPATIENT
Start: 2022-10-26

## 2022-10-26 RX ORDER — AMLODIPINE BESYLATE 5 MG/1
5 TABLET ORAL DAILY
Qty: 90 TABLET | Refills: 1 | Status: SHIPPED | OUTPATIENT
Start: 2022-10-26

## 2022-10-26 SDOH — ECONOMIC STABILITY: FOOD INSECURITY: WITHIN THE PAST 12 MONTHS, YOU WORRIED THAT YOUR FOOD WOULD RUN OUT BEFORE YOU GOT MONEY TO BUY MORE.: NEVER TRUE

## 2022-10-26 SDOH — ECONOMIC STABILITY: FOOD INSECURITY: WITHIN THE PAST 12 MONTHS, THE FOOD YOU BOUGHT JUST DIDN'T LAST AND YOU DIDN'T HAVE MONEY TO GET MORE.: NEVER TRUE

## 2022-10-26 ASSESSMENT — SOCIAL DETERMINANTS OF HEALTH (SDOH): HOW HARD IS IT FOR YOU TO PAY FOR THE VERY BASICS LIKE FOOD, HOUSING, MEDICAL CARE, AND HEATING?: NOT HARD AT ALL

## 2022-10-27 ENCOUNTER — TELEPHONE (OUTPATIENT)
Dept: FAMILY MEDICINE CLINIC | Age: 87
End: 2022-10-27

## 2022-10-27 NOTE — PROGRESS NOTES
[address] [place]     10/26/22  Joann Tellez : 1932 Sex: female  Age: 80 y.o. Chief Complaint   Patient presents with    Hypertension     4 months       HPI    Presents today for 4-month follow-up visit on her medical problems. She is accompanied by her . Reviewed my last note. Since I seen her last she has seen urology for frequency and urge incontinence, bladder cancer history and treatment with Botox. I reviewed the last note. Seems to be doing well with them. Tells me blood pressure has been stable and controlled on her antihypertensive medication. Lipids have been stable controlled on her statin medication. She does claim to be tired all the time and really states does not sleep well nor has she slept well for extended period of time. On antidepressant medications in the past without much success. Recommend consideration for trial of trazodone at a low dose to see if this would help with both sleep and depression. She does continue to follow with urology for her bladder cancer and Botox injections for urinary incontinence. Also follows with ophthalmology for her macular degeneration and plastics for her skin cancer  Recently saw GI for loose stool         Review of Systems    Const: Denies chills, fever and sweats. ENMT: Denies ear symptoms. Reports postnasal drip, but denies other nasal symptoms. Denies mouth or throat  symptoms. CV: Denies chest pain, orthopnea and palpitations. Resp: Denies cough, SOB and wheezing. GI: Denies diarrhea, nausea and vomiting. : Urinary: reports incontinence, but denies dysuria, frequency and frequent UTI's--she is following with urology. She has had InterStim placement and Botox treatment in the past. She also has history of bladder  cancer. Musculo: Reports arthritis.   Neuro: Reports difficulty with balance but denies dizziness, headache, seizures and syncope/Improved/no longer  following with neurology-symptoms have improved. Psych: Reports anxiety, depression and stress and insomnia-some better. She weaned herself off of sertraline and did not tolerate Remeron. Still feels she should be on something but she has declined past.  Now amenable to trazodone trial.  Does not appear to be suicidal.              REST OF PERTINENT ROS GONE OVER AND WAS NEGATIVE. PMH:  Problem List: Essential hypertension, Benign essential hypertension, Taking medication, Hypothyroidism  Health Maintenance:  Couseled on Home Safety - (2/5/2018)  Mammogram - (5/1/2011)  Mammogram Screening - (5/31/2007)  Colonoscopy - (5/19/2010)  Bone Density Test Screening - (6/19/2008)  Pelvic/Pap Exam - with Dr. Lucila Smalls  Rectal Exam - 5/10  Bone Density Scan - 6/06,6/08,9/13,8/17-nl  Duplex Carotid Ultasound - 11/06,3/09  2D ECHO - 6/14  EKG - 12/12,8/13,5/14,10/14,3/16,4/16  Hemmocult Cards - 7/15-neg x 3  Holter monitor - 3/16  Prevnar Vaccine - (7/2015)  Pneumonia Vaccination - (2009)  Tetanus Immunization - (2010)  Zoster/Shingles Vaccine - (2016) [facility]  Influenza Vaccination - (9/2016)  Medical Problems:  Hypertension - . Hypothyroidism - . Incontinence - .bladder interstim/botox  transient ischemic attack - (1/2007) . Abdominal Hernia Left - followed by Dr. Symone Reyes  Dysphagia - . Tinnitus - .  Varicose Veins - tx with sclerotherapy  Arthritis - shoulders; injected by Dr Brook Singh - follows with urology  reflex sympathetic dystrophy  Pulmonary Nodule - followed and stable  Sleep Apnea  Hyperlipidemia - statin intolerance  Anxiety, Depression, Osteoarthritis, LLQ spigelian hernia repair, Hiatal Hernia, Lumbar DDD, Lumbar Spinal Stenosis,  Diverticulosis  Valvular Heart Disease - mild  InterStim placement - 3/16  Macular Degeneration  Left carpal tunnel surgery - 2/18  Basal cell skin cancer  Insomnia  Surgical Hx:  Bladder Repair - X4  Ovarian Cyst - . Oophorectomy- - . Hysterectomy, Partial - (1977) .   Tonsillectomy W/ Adenoidectomy - (18) (age 10 Years) . Endoscopy - (2006) . Cataract Removal - bilateral  carpal tunnel release - remotely, right hand  carpal tunnel release, left - (2018) Dr. Gina Lorenzo: Thad Remy, for reading  OB/Gyn Hx:: (3)Parity: Full term (3)  Reviewed, no changes.                         Current Outpatient Medications:     amLODIPine (NORVASC) 5 MG tablet, Take 1 tablet by mouth daily, Disp: 90 tablet, Rfl: 1    atorvastatin (LIPITOR) 20 MG tablet, Take 1 tablet by mouth daily, Disp: 90 tablet, Rfl: 1    losartan (COZAAR) 100 MG tablet, Take 1 tablet by mouth daily, Disp: 90 tablet, Rfl: 1    traZODone (DESYREL) 50 MG tablet, 1/2 pill po qpm sleep, Disp: 30 tablet, Rfl: 3    colestipol (COLESTID) 1 g tablet, TAKE 2 TO 4 TABLETS BY MOUTH DAILY AS NEEDED, Disp: , Rfl:     Coenzyme Q10 (CO Q-10 PO), Take by mouth, Disp: , Rfl:     zolpidem (AMBIEN) 5 MG tablet, Take 5 mg by mouth nightly as needed for Sleep., Disp: , Rfl:     Multiple Vitamin (DAILY VALUE MULTIVITAMIN) TABS, MULTIPLE VITAMIN DAILY VALUE MULTIVITAMIN TABS 1 tablet daily  MULTIPLE VITAMIN [id number] Charmaine Boles MD 2020  [address] [place] [address] ([id number]), Disp: , Rfl:     Turmeric (CURCUMIN 95 PO), Take by mouth, Disp: , Rfl:     vitamin D (CHOLECALCIFEROL) 1000 UNIT TABS tablet, Take by mouth, Disp: , Rfl:     vitamin B-12 (CYANOCOBALAMIN) 1000 MCG tablet, Take by mouth, Disp: , Rfl:     magnesium oxide (MAG-OX) 400 MG tablet, Take by mouth, Disp: , Rfl:   No Known Allergies    Past Medical History:   Diagnosis Date    Abdominal hernia     SANKOVIC    Anxiety     Arthritis     shoulders; injected by dr Stu Josue    Bladder cancer [facility])     DDD (degenerative disc disease), cervical     lumbar    Depression     Diverticulosis     Dysphagia     Hiatal hernia     Hyperlipidemia     Hypertension     Hypothyroidism     Incontinence     Macular degeneration     Pulmonary nodule Reflex sympathetic dystrophy     Sleep apnea     Spigelian hernia     Spinal stenosis     lumbar    Tinnitus     Transient ischemic attack 01/2007    Valvular heart disease      Past Surgical History:   Procedure Laterality Date    BLADDER REPAIR      x4    CARPAL TUNNEL RELEASE Right     CARPAL TUNNEL RELEASE Left 02/2018    kylah    CATARACT REMOVAL Bilateral     OVARY REMOVAL      PARTIAL HYSTERECTOMY (CERVIX NOT REMOVED)  1977    TONSILLECTOMY  1941     Family History   Problem Relation Age of Onset    Stroke Mother     Prostate Cancer Father     Lung Cancer Sister     Prostate Cancer Brother     Coronary Art Dis Brother     Pancreatic Cancer Brother     Breast Cancer Maternal Aunt     Cancer Paternal Aunt         ovarian, stomach    Cancer Paternal Uncle     Stroke Maternal Grandmother     Other Son         neuroendocine malignancy    Other Daughter         neuroendocine malignancy     Social History     Socioeconomic History    Marital status:      Spouse name: Not on file    Number of children: Not on file    Years of education: Not on file    Highest education level: Not on file   Occupational History    Not on file   Tobacco Use    Smoking status: Never    Smokeless tobacco: Never   Substance and Sexual Activity    Alcohol use: Yes     Comment: occ    Drug use: Not on file    Sexual activity: Not on file   Other Topics Concern    Not on file   Social History Narrative    Not on file     Social Determinants of Health     Financial Resource Strain: Low Risk     Difficulty of Paying Living Expenses: Not hard at all   Food Insecurity: No Food Insecurity    Worried About Running Out of Food in the Last Year: Never true    Ran Out of Food in the Last Year: Never true   Transportation Needs: Not on file   Physical Activity: Inactive    Days of Exercise per Week: 0 days    Minutes of Exercise per Session: 0 min   Stress: Not on file   Social Connections: Not on file   Intimate Partner Violence: Not on file Housing Stability: Not on file       Vitals:    10/26/22 1248   BP: 112/66   Pulse: 89   Temp: 98 °F (36.7 °C)   TempSrc: Temporal   SpO2: 95%   Weight: 153 lb 6.4 oz (69.6 kg)   Height: 5' 3\" (1.6 m)       Physical Exam    Const: Appears well developed and well nourished. No signs of acute distress present. Neck: Supple and symmetric. Palpation reveals no adenopathy. No masses appreciated. Thyroid exhibits no nodule  or thyromegaly. No JVD. Carotids: 2+ and equal bilaterally, without bruits. Resp: No rales, rhonchi or wheezes appreciated over the lungs bilaterally. CV: Rate is regular. Rhythm is regular/occasional ectopy. S1 is normal. S2 is normal. No gallop or rubs. No heart  murmur appreciated. Extremities: Edema, but no clubbing or cyanosis/trace  Abdomen: Bowel sounds are normoactive. Palpation reveals softness, with no distension, organomegaly or  tenderness. No abdominal masses palpable. No palpable hepatosplenomegaly. Psych: Patient's attitude is cooperative. affect was okay today.  jUdgement is realistic. Insight is appropriate. Neurologically grossly intact without focal deficits noted. Assessment and Plan:  Anamaria Wan was seen today for hypertension. Diagnoses and all orders for this visit:    Fatigue, unspecified type  -     TSH; Future  Trial of trazodone to assist with sleep and depression    Hyperlipidemia, unspecified hyperlipidemia type  -     atorvastatin (LIPITOR) 20 MG tablet; Take 1 tablet by mouth daily  Stable and controlled on statin medication    Essential hypertension  -     losartan (COZAAR) 100 MG tablet; Take 1 tablet by mouth daily  -     CBC with Auto Differential; Future  -     Comprehensive Metabolic Panel;  Future  Stable and controlled on antihypertensive medication    Major depressive disorder, remission status unspecified, unspecified whether recurrent  Trial of trazodone    Anxiety    Insomnia, unspecified type    Other orders  -     amLODIPine (NORVASC) 5 MG tablet; Take 1 tablet by mouth daily  -     traZODone (DESYREL) 50 MG tablet; 1/2 pill po qpm sleep    Plan: We will set her up with new physician to establish and follow in 4 months. Trial of trazodone 50 mg half a pill every afternoon. We will probably need to titrate that up some. Continue monitoring blood pressure closely. Precautions. Work to monitor disease progression and medication use. Prescription management performed after review of efficacy of medication on her current medical problems with above consultants. Notify us of problems in the interim. Return in about 4 months (around 2/26/2023). Seen By:  Kumar Garcia MD      *Document was created using voice recognition software. Note was reviewed however may contain grammatical errors.

## 2023-02-21 PROBLEM — D48.5 NEOPLASM OF UNCERTAIN BEHAVIOR OF SKIN: Status: RESOLVED | Noted: 2020-12-04 | Resolved: 2023-02-21

## 2023-02-21 PROBLEM — D48.5 NEOPLASM OF UNCERTAIN BEHAVIOR OF SKIN: Status: ACTIVE | Noted: 2020-12-04

## 2023-02-21 PROBLEM — Z85.51 HISTORY OF BLADDER CANCER: Status: ACTIVE | Noted: 2019-08-29

## 2023-04-10 PROBLEM — G47.00 INSOMNIA: Status: ACTIVE | Noted: 2023-04-10

## 2023-06-22 ENCOUNTER — OFFICE VISIT (OUTPATIENT)
Dept: PRIMARY CARE CLINIC | Age: 88
End: 2023-06-22
Payer: MEDICARE

## 2023-06-22 VITALS
SYSTOLIC BLOOD PRESSURE: 136 MMHG | BODY MASS INDEX: 28.03 KG/M2 | OXYGEN SATURATION: 96 % | HEART RATE: 81 BPM | DIASTOLIC BLOOD PRESSURE: 80 MMHG | HEIGHT: 63 IN | WEIGHT: 158.2 LBS | TEMPERATURE: 97.3 F

## 2023-06-22 DIAGNOSIS — G47.00 INSOMNIA, UNSPECIFIED TYPE: Primary | ICD-10-CM

## 2023-06-22 DIAGNOSIS — Z85.51 HISTORY OF BLADDER CANCER: ICD-10-CM

## 2023-06-22 DIAGNOSIS — G25.81 RESTLESS LEG SYNDROME: ICD-10-CM

## 2023-06-22 DIAGNOSIS — N18.31 STAGE 3A CHRONIC KIDNEY DISEASE (HCC): ICD-10-CM

## 2023-06-22 DIAGNOSIS — R32 POSTOPERATIVE URINARY INCONTINENCE: ICD-10-CM

## 2023-06-22 DIAGNOSIS — G11.9 CEREBELLAR ATAXIA (HCC): ICD-10-CM

## 2023-06-22 DIAGNOSIS — I10 ESSENTIAL HYPERTENSION: ICD-10-CM

## 2023-06-22 DIAGNOSIS — Z71.89 COUNSELING REGARDING ADVANCED DIRECTIVES AND GOALS OF CARE: ICD-10-CM

## 2023-06-22 DIAGNOSIS — E03.9 ACQUIRED HYPOTHYROIDISM: ICD-10-CM

## 2023-06-22 DIAGNOSIS — Z00.00 MEDICARE ANNUAL WELLNESS VISIT, SUBSEQUENT: Primary | ICD-10-CM

## 2023-06-22 DIAGNOSIS — N99.89 POSTOPERATIVE URINARY INCONTINENCE: ICD-10-CM

## 2023-06-22 LAB
ALBUMIN SERPL-MCNC: 4.4 G/DL (ref 3.5–5.2)
ALP SERPL-CCNC: 93 U/L (ref 35–104)
ALT SERPL-CCNC: 15 U/L (ref 0–32)
ANION GAP SERPL CALCULATED.3IONS-SCNC: 15 MMOL/L (ref 7–16)
AST SERPL-CCNC: 26 U/L (ref 0–31)
BILIRUB SERPL-MCNC: 0.7 MG/DL (ref 0–1.2)
BUN SERPL-MCNC: 19 MG/DL (ref 6–23)
CALCIUM SERPL-MCNC: 9.4 MG/DL (ref 8.6–10.2)
CHLORIDE SERPL-SCNC: 104 MMOL/L (ref 98–107)
CO2 SERPL-SCNC: 25 MMOL/L (ref 22–29)
CREAT SERPL-MCNC: 1 MG/DL (ref 0.5–1)
ERYTHROCYTE [DISTWIDTH] IN BLOOD BY AUTOMATED COUNT: 14 FL (ref 11.5–15)
FERRITIN SERPL-MCNC: 263 NG/ML
FOLATE SERPL-MCNC: >20 NG/ML (ref 4.8–24.2)
GLUCOSE SERPL-MCNC: 96 MG/DL (ref 74–99)
HCT VFR BLD AUTO: 43.2 % (ref 34–48)
HGB BLD-MCNC: 13.1 G/DL (ref 11.5–15.5)
IRON SATN MFR SERPL: 56 % (ref 15–50)
IRON SERPL-MCNC: 144 MCG/DL (ref 37–145)
MAGNESIUM SERPL-MCNC: 2.4 MG/DL (ref 1.6–2.6)
MCH RBC QN AUTO: 27.8 PG (ref 26–35)
MCHC RBC AUTO-ENTMCNC: 30.3 % (ref 32–34.5)
MCV RBC AUTO: 91.5 FL (ref 80–99.9)
PLATELET # BLD AUTO: 222 E9/L (ref 130–450)
PMV BLD AUTO: 11.2 FL (ref 7–12)
POTASSIUM SERPL-SCNC: 4.6 MMOL/L (ref 3.5–5)
PROT SERPL-MCNC: 7.1 G/DL (ref 6.4–8.3)
RBC # BLD AUTO: 4.72 E12/L (ref 3.5–5.5)
SODIUM SERPL-SCNC: 144 MMOL/L (ref 132–146)
TIBC SERPL-MCNC: 259 MCG/DL (ref 250–450)
TSH SERPL-MCNC: 2.37 UIU/ML (ref 0.27–4.2)
VIT B12 SERPL-MCNC: 1981 PG/ML (ref 211–946)
VITAMIN D 25-HYDROXY: 72 NG/ML (ref 30–100)
WBC # BLD: 6.9 E9/L (ref 4.5–11.5)

## 2023-06-22 PROCEDURE — 99214 OFFICE O/P EST MOD 30 MIN: CPT | Performed by: STUDENT IN AN ORGANIZED HEALTH CARE EDUCATION/TRAINING PROGRAM

## 2023-06-22 PROCEDURE — G0439 PPPS, SUBSEQ VISIT: HCPCS | Performed by: STUDENT IN AN ORGANIZED HEALTH CARE EDUCATION/TRAINING PROGRAM

## 2023-06-22 PROCEDURE — 1123F ACP DISCUSS/DSCN MKR DOCD: CPT | Performed by: STUDENT IN AN ORGANIZED HEALTH CARE EDUCATION/TRAINING PROGRAM

## 2023-06-22 PROCEDURE — 36415 COLL VENOUS BLD VENIPUNCTURE: CPT | Performed by: STUDENT IN AN ORGANIZED HEALTH CARE EDUCATION/TRAINING PROGRAM

## 2023-06-22 RX ORDER — TRAZODONE HYDROCHLORIDE 50 MG/1
TABLET ORAL
COMMUNITY
Start: 2023-05-07 | End: 2023-06-22 | Stop reason: SINTOL

## 2023-06-22 ASSESSMENT — PATIENT HEALTH QUESTIONNAIRE - PHQ9
4. FEELING TIRED OR HAVING LITTLE ENERGY: 0
10. IF YOU CHECKED OFF ANY PROBLEMS, HOW DIFFICULT HAVE THESE PROBLEMS MADE IT FOR YOU TO DO YOUR WORK, TAKE CARE OF THINGS AT HOME, OR GET ALONG WITH OTHER PEOPLE: 0
SUM OF ALL RESPONSES TO PHQ QUESTIONS 1-9: 0
1. LITTLE INTEREST OR PLEASURE IN DOING THINGS: 0
SUM OF ALL RESPONSES TO PHQ QUESTIONS 1-9: 0
2. FEELING DOWN, DEPRESSED OR HOPELESS: 0
8. MOVING OR SPEAKING SO SLOWLY THAT OTHER PEOPLE COULD HAVE NOTICED. OR THE OPPOSITE, BEING SO FIGETY OR RESTLESS THAT YOU HAVE BEEN MOVING AROUND A LOT MORE THAN USUAL: 0
SUM OF ALL RESPONSES TO PHQ QUESTIONS 1-9: 0
7. TROUBLE CONCENTRATING ON THINGS, SUCH AS READING THE NEWSPAPER OR WATCHING TELEVISION: 0
5. POOR APPETITE OR OVEREATING: 0
SUM OF ALL RESPONSES TO PHQ9 QUESTIONS 1 & 2: 0
3. TROUBLE FALLING OR STAYING ASLEEP: 0
9. THOUGHTS THAT YOU WOULD BE BETTER OFF DEAD, OR OF HURTING YOURSELF: 0
6. FEELING BAD ABOUT YOURSELF - OR THAT YOU ARE A FAILURE OR HAVE LET YOURSELF OR YOUR FAMILY DOWN: 0
SUM OF ALL RESPONSES TO PHQ QUESTIONS 1-9: 0

## 2023-06-22 ASSESSMENT — LIFESTYLE VARIABLES
HOW OFTEN DO YOU HAVE A DRINK CONTAINING ALCOHOL: NEVER
HOW MANY STANDARD DRINKS CONTAINING ALCOHOL DO YOU HAVE ON A TYPICAL DAY: PATIENT DOES NOT DRINK

## 2023-06-30 VITALS
TEMPERATURE: 97.3 F | DIASTOLIC BLOOD PRESSURE: 80 MMHG | OXYGEN SATURATION: 96 % | HEART RATE: 81 BPM | SYSTOLIC BLOOD PRESSURE: 136 MMHG | WEIGHT: 158.2 LBS | HEIGHT: 63 IN | BODY MASS INDEX: 28.03 KG/M2

## 2023-07-03 ASSESSMENT — ENCOUNTER SYMPTOMS
COUGH: 0
SHORTNESS OF BREATH: 0

## 2023-07-03 NOTE — ASSESSMENT & PLAN NOTE
Chronic, uncontrolled  Not associated with mood disturbance  Failed trazodone, Remeron  Possible restless leg syndrome  Stop nightly melatonin  Counseled on safe supplements for sleep and relaxation

## 2023-10-24 ENCOUNTER — OFFICE VISIT (OUTPATIENT)
Dept: PRIMARY CARE CLINIC | Age: 88
End: 2023-10-24
Payer: MEDICARE

## 2023-10-24 VITALS
HEART RATE: 94 BPM | WEIGHT: 153.3 LBS | HEIGHT: 63 IN | TEMPERATURE: 97.9 F | OXYGEN SATURATION: 96 % | BODY MASS INDEX: 27.16 KG/M2 | SYSTOLIC BLOOD PRESSURE: 126 MMHG | DIASTOLIC BLOOD PRESSURE: 74 MMHG

## 2023-10-24 DIAGNOSIS — G56.21 ULNAR NERVE IMPINGEMENT, RIGHT: ICD-10-CM

## 2023-10-24 DIAGNOSIS — G25.81 RESTLESS LEG SYNDROME: Primary | ICD-10-CM

## 2023-10-24 DIAGNOSIS — G47.00 INSOMNIA, UNSPECIFIED TYPE: ICD-10-CM
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
## 2023-10-24 PROCEDURE — 99214 OFFICE O/P EST MOD 30 MIN: CPT | Performed by: STUDENT IN AN ORGANIZED HEALTH CARE EDUCATION/TRAINING PROGRAM

## 2023-10-24 PROCEDURE — 1123F ACP DISCUSS/DSCN MKR DOCD: CPT | Performed by: STUDENT IN AN ORGANIZED HEALTH CARE EDUCATION/TRAINING PROGRAM

## 2023-10-24 RX ORDER — ESTRADIOL 0.1 MG/G
CREAM VAGINAL
COMMUNITY
Start: 2023-07-19

## 2023-10-24 RX ORDER — VIBEGRON 75 MG/1
75 TABLET, FILM COATED ORAL DAILY
COMMUNITY
Start: 2023-09-01

## 2023-10-24 RX ORDER — PANCRELIPASE LIPASE, PANCRELIPASE PROTEASE, PANCRELIPASE AMYLASE 40000; 126000; 168000 [USP'U]/1; [USP'U]/1; [USP'U]/1
CAPSULE, DELAYED RELEASE ORAL
COMMUNITY
Start: 2023-08-02

## 2023-10-24 RX ORDER — TRAZODONE HYDROCHLORIDE 50 MG/1
TABLET ORAL
COMMUNITY
Start: 2023-08-27

## 2023-10-24 RX ORDER — GABAPENTIN 100 MG/1
100 CAPSULE ORAL NIGHTLY
Qty: 90 CAPSULE | Refills: 1 | Status: SHIPPED | OUTPATIENT
Start: 2023-10-24 | End: 2024-04-21

## 2023-10-24 NOTE — PROGRESS NOTES
orders of the defined types were placed in this encounter. No results found for this or any previous visit (from the past 24 hour(s)).

## 2023-10-31 ASSESSMENT — ENCOUNTER SYMPTOMS
COUGH: 0
SHORTNESS OF BREATH: 0
BACK PAIN: 0

## 2023-10-31 NOTE — ASSESSMENT & PLAN NOTE
Chronic, uncontrolled  Labs unremarkable  Did not improve with stopping atorvastatin  Trial low dose gabapentin- counseled on risks due to age

## 2023-10-31 NOTE — ASSESSMENT & PLAN NOTE
Chronic, uncontrolled  Failed trazodone, Remeron  Monitor for improvement with restless leg treatment  Consider medication if does not improve

## 2023-11-06 RX ORDER — TRAZODONE HYDROCHLORIDE 50 MG/1
TABLET ORAL
Qty: 30 TABLET | OUTPATIENT
Start: 2023-11-06

## 2024-01-12 DIAGNOSIS — I10 ESSENTIAL HYPERTENSION: ICD-10-CM

## 2024-01-12 DIAGNOSIS — E78.5 HYPERLIPIDEMIA, UNSPECIFIED HYPERLIPIDEMIA TYPE: ICD-10-CM

## 2024-01-12 RX ORDER — LOSARTAN POTASSIUM 100 MG/1
100 TABLET ORAL DAILY
Qty: 90 TABLET | Refills: 1 | Status: SHIPPED | OUTPATIENT
Start: 2024-01-12

## 2024-01-12 RX ORDER — ATORVASTATIN CALCIUM 20 MG/1
20 TABLET, FILM COATED ORAL EVERY OTHER DAY
Qty: 45 TABLET | Refills: 1 | Status: SHIPPED | OUTPATIENT
Start: 2024-01-12

## 2024-01-12 NOTE — TELEPHONE ENCOUNTER
Patients last appointment 10/24/2023.  Patients next scheduled appointment   Future Appointments   Date Time Provider Department Center   2/27/2024  2:00 PM Juan Meneses MD [facility]   6/25/2024  2:00 PM Juan Meneses MD [facility]

## 2024-01-12 NOTE — TELEPHONE ENCOUNTER
Spoke with wife and she states she takes this however I informed her we did not have this on her record that she was taking this. She states she's been out of it for a little bit bc it was from Dr Castro and she is now requesting refill on atorvastatin

## 2024-03-07 ENCOUNTER — OFFICE VISIT (OUTPATIENT)
Dept: PRIMARY CARE CLINIC | Age: 89
End: 2024-03-07
Payer: MEDICARE

## 2024-03-07 VITALS
RESPIRATION RATE: 14 BRPM | DIASTOLIC BLOOD PRESSURE: 80 MMHG | WEIGHT: 148.4 LBS | HEIGHT: 63 IN | HEART RATE: 65 BPM | OXYGEN SATURATION: 95 % | SYSTOLIC BLOOD PRESSURE: 122 MMHG | BODY MASS INDEX: 26.29 KG/M2 | TEMPERATURE: 97.5 F

## 2024-03-07 DIAGNOSIS — R32 POSTOPERATIVE URINARY INCONTINENCE: ICD-10-CM

## 2024-03-07 DIAGNOSIS — G11.9 CEREBELLAR ATAXIA (HCC): ICD-10-CM

## 2024-03-07 DIAGNOSIS — G25.81 RESTLESS LEG SYNDROME: ICD-10-CM

## 2024-03-07 DIAGNOSIS — N32.81 OVERACTIVE BLADDER: ICD-10-CM

## 2024-03-07 DIAGNOSIS — N18.31 STAGE 3A CHRONIC KIDNEY DISEASE (HCC): ICD-10-CM

## 2024-03-07 DIAGNOSIS — G47.00 INSOMNIA, UNSPECIFIED TYPE: Primary | ICD-10-CM

## 2024-03-07 DIAGNOSIS — Z85.51 HISTORY OF BLADDER CANCER: ICD-10-CM

## 2024-03-07 DIAGNOSIS — N99.89 POSTOPERATIVE URINARY INCONTINENCE: ICD-10-CM

## 2024-03-07 PROCEDURE — 1123F ACP DISCUSS/DSCN MKR DOCD: CPT | Performed by: STUDENT IN AN ORGANIZED HEALTH CARE EDUCATION/TRAINING PROGRAM

## 2024-03-07 PROCEDURE — 99214 OFFICE O/P EST MOD 30 MIN: CPT | Performed by: STUDENT IN AN ORGANIZED HEALTH CARE EDUCATION/TRAINING PROGRAM

## 2024-03-07 RX ORDER — KETOROLAC TROMETHAMINE 5 MG/ML
SOLUTION OPHTHALMIC
COMMUNITY
Start: 2024-02-20

## 2024-03-07 RX ORDER — SUVOREXANT 10 MG/1
1 TABLET, FILM COATED ORAL NIGHTLY
Qty: 90 TABLET | Refills: 1 | Status: SHIPPED | OUTPATIENT
Start: 2024-03-07 | End: 2024-09-03

## 2024-03-07 RX ORDER — GABAPENTIN 100 MG/1
200 CAPSULE ORAL NIGHTLY
Qty: 180 CAPSULE | Refills: 1 | Status: SHIPPED | OUTPATIENT
Start: 2024-03-07 | End: 2024-09-03

## 2024-03-07 RX ORDER — SOLIFENACIN SUCCINATE 5 MG/1
5 TABLET, FILM COATED ORAL DAILY
COMMUNITY
Start: 2024-02-06

## 2024-03-07 SDOH — ECONOMIC STABILITY: HOUSING INSECURITY
IN THE LAST 12 MONTHS, WAS THERE A TIME WHEN YOU DID NOT HAVE A STEADY PLACE TO SLEEP OR SLEPT IN A SHELTER (INCLUDING NOW)?: NO

## 2024-03-07 SDOH — ECONOMIC STABILITY: FOOD INSECURITY: WITHIN THE PAST 12 MONTHS, THE FOOD YOU BOUGHT JUST DIDN'T LAST AND YOU DIDN'T HAVE MONEY TO GET MORE.: NEVER TRUE

## 2024-03-07 SDOH — ECONOMIC STABILITY: FOOD INSECURITY: WITHIN THE PAST 12 MONTHS, YOU WORRIED THAT YOUR FOOD WOULD RUN OUT BEFORE YOU GOT MONEY TO BUY MORE.: NEVER TRUE

## 2024-03-07 SDOH — ECONOMIC STABILITY: INCOME INSECURITY: HOW HARD IS IT FOR YOU TO PAY FOR THE VERY BASICS LIKE FOOD, HOUSING, MEDICAL CARE, AND HEATING?: NOT HARD AT ALL

## 2024-03-07 ASSESSMENT — PATIENT HEALTH QUESTIONNAIRE - PHQ9
SUM OF ALL RESPONSES TO PHQ QUESTIONS 1-9: 6
7. TROUBLE CONCENTRATING ON THINGS, SUCH AS READING THE NEWSPAPER OR WATCHING TELEVISION: NOT AT ALL
9. THOUGHTS THAT YOU WOULD BE BETTER OFF DEAD, OR OF HURTING YOURSELF: NOT AT ALL
2. FEELING DOWN, DEPRESSED OR HOPELESS: NOT AT ALL
3. TROUBLE FALLING OR STAYING ASLEEP: NEARLY EVERY DAY
SUM OF ALL RESPONSES TO PHQ9 QUESTIONS 1 & 2: 0
SUM OF ALL RESPONSES TO PHQ QUESTIONS 1-9: 6
4. FEELING TIRED OR HAVING LITTLE ENERGY: NEARLY EVERY DAY
SUM OF ALL RESPONSES TO PHQ QUESTIONS 1-9: 6
8. MOVING OR SPEAKING SO SLOWLY THAT OTHER PEOPLE COULD HAVE NOTICED. OR THE OPPOSITE, BEING SO FIGETY OR RESTLESS THAT YOU HAVE BEEN MOVING AROUND A LOT MORE THAN USUAL: NOT AT ALL
5. POOR APPETITE OR OVEREATING: NOT AT ALL
SUM OF ALL RESPONSES TO PHQ QUESTIONS 1-9: 6
1. LITTLE INTEREST OR PLEASURE IN DOING THINGS: NOT AT ALL
6. FEELING BAD ABOUT YOURSELF - OR THAT YOU ARE A FAILURE OR HAVE LET YOURSELF OR YOUR FAMILY DOWN: NOT AT ALL

## 2024-03-07 NOTE — PROGRESS NOTES
General: Skin is warm and dry.   Neurological:      Mental Status: She is alert and oriented to person, place, and time.   Psychiatric:         Mood and Affect: Mood normal.         Behavior: Behavior normal.       Testing:     Orders Placed This Encounter   Procedures    DME Order for (Specify) as OP     - DME device ordered - incontinence supplies (disposable bed pads/chux, overnight pads 8/day) 90-day supply  - Diagnosis: history bladder cancer, incontinence, overactive bladder  - Length of Need: Lifetime     Scheduling Instructions:      [facility]      No results found for this or any previous visit (from the past 24 hour(s)).

## 2024-03-19 ASSESSMENT — ENCOUNTER SYMPTOMS
COUGH: 0
BACK PAIN: 0

## 2024-04-16 DIAGNOSIS — G47.00 INSOMNIA, UNSPECIFIED TYPE: ICD-10-CM

## 2024-04-17 NOTE — TELEPHONE ENCOUNTER
I thought per last visit patient was going to stop this medication and trial Belsomra 10 mg nightly?

## 2024-04-24 DIAGNOSIS — E78.5 HYPERLIPIDEMIA, UNSPECIFIED HYPERLIPIDEMIA TYPE: ICD-10-CM

## 2024-04-24 RX ORDER — ATORVASTATIN CALCIUM 20 MG/1
20 TABLET, FILM COATED ORAL EVERY OTHER DAY
Qty: 45 TABLET | Refills: 1 | Status: SHIPPED | OUTPATIENT
Start: 2024-04-24

## 2024-04-24 RX ORDER — MIRTAZAPINE 7.5 MG/1
7.5 TABLET, FILM COATED ORAL NIGHTLY
Qty: 90 TABLET | Refills: 1 | OUTPATIENT
Start: 2024-04-24

## 2024-04-24 NOTE — TELEPHONE ENCOUNTER
Patients last appointment 3/7/2024.  Patients next scheduled appointment   Future Appointments   Date Time Provider Department Center   6/25/2024  2:00 PM Juan Meneses MD [facility]   6/25/2024  2:15 PM Juan Meneses MD [facility]

## 2024-04-24 NOTE — TELEPHONE ENCOUNTER
Spoke with  and she is going to stop this and trial the Belsomra. I told him the belsomra should be at the pharmacy if she hasn't gotten it yet. He will call them and make sure if there is an issue he will call me back.

## 2024-06-18 DIAGNOSIS — G47.00 INSOMNIA, UNSPECIFIED TYPE: ICD-10-CM

## 2024-06-18 RX ORDER — MIRTAZAPINE 7.5 MG/1
7.5 TABLET, FILM COATED ORAL NIGHTLY
Qty: 90 TABLET | Refills: 1 | OUTPATIENT
Start: 2024-06-18

## 2024-06-18 RX ORDER — SUVOREXANT 10 MG/1
1 TABLET, FILM COATED ORAL NIGHTLY
Qty: 90 TABLET | Refills: 1 | OUTPATIENT
Start: 2024-06-18 | End: 2024-12-15

## 2024-06-18 NOTE — TELEPHONE ENCOUNTER
Last Appointment:  3/7/2024    Future appts:  Future Appointments   Date Time Provider Department Center   6/25/2024  2:00 PM Juan Meneses MD [facility]   6/25/2024  2:15 PM Juan Meneses MD [facility]

## 2024-06-19 NOTE — TELEPHONE ENCOUNTER
Patients last appointment 3/7/2024.  Patients next scheduled appointment   Future Appointments   Date Time Provider Department Center   6/25/2024  2:00 PM Juan Meneses MD [facility]   6/25/2024  2:15 PM Juan Meneses MD [facility]

## 2024-06-25 ENCOUNTER — OFFICE VISIT (OUTPATIENT)
Dept: PRIMARY CARE CLINIC | Age: 89
End: 2024-06-25
Payer: MEDICARE

## 2024-06-25 VITALS
WEIGHT: 158.8 LBS | HEIGHT: 63 IN | OXYGEN SATURATION: 95 % | DIASTOLIC BLOOD PRESSURE: 82 MMHG | BODY MASS INDEX: 28.14 KG/M2 | HEART RATE: 73 BPM | SYSTOLIC BLOOD PRESSURE: 122 MMHG

## 2024-06-25 VITALS
HEART RATE: 73 BPM | WEIGHT: 158.8 LBS | OXYGEN SATURATION: 95 % | SYSTOLIC BLOOD PRESSURE: 122 MMHG | BODY MASS INDEX: 28.14 KG/M2 | HEIGHT: 63 IN | TEMPERATURE: 98.2 F | DIASTOLIC BLOOD PRESSURE: 82 MMHG

## 2024-06-25 DIAGNOSIS — N18.31 STAGE 3A CHRONIC KIDNEY DISEASE (HCC): ICD-10-CM

## 2024-06-25 DIAGNOSIS — Z51.81 MEDICATION MONITORING ENCOUNTER: ICD-10-CM

## 2024-06-25 DIAGNOSIS — E78.5 HYPERLIPIDEMIA, UNSPECIFIED HYPERLIPIDEMIA TYPE: ICD-10-CM

## 2024-06-25 DIAGNOSIS — G47.00 INSOMNIA, UNSPECIFIED TYPE: Primary | ICD-10-CM

## 2024-06-25 DIAGNOSIS — E03.9 ACQUIRED HYPOTHYROIDISM: ICD-10-CM

## 2024-06-25 DIAGNOSIS — Z00.00 MEDICARE ANNUAL WELLNESS VISIT, SUBSEQUENT: Primary | ICD-10-CM

## 2024-06-25 DIAGNOSIS — G25.81 RESTLESS LEG SYNDROME: ICD-10-CM

## 2024-06-25 DIAGNOSIS — H35.3130 BILATERAL NONEXUDATIVE AGE-RELATED MACULAR DEGENERATION, UNSPECIFIED STAGE: ICD-10-CM

## 2024-06-25 DIAGNOSIS — I10 ESSENTIAL HYPERTENSION: ICD-10-CM

## 2024-06-25 DIAGNOSIS — Z71.89 COUNSELING REGARDING ADVANCED DIRECTIVES AND GOALS OF CARE: ICD-10-CM

## 2024-06-25 LAB
ALBUMIN: 4.1 G/DL (ref 3.5–5.2)
ALP BLD-CCNC: 97 U/L (ref 35–104)
ALT SERPL-CCNC: 9 U/L (ref 0–32)
ANION GAP SERPL CALCULATED.3IONS-SCNC: 19 MMOL/L (ref 7–16)
AST SERPL-CCNC: 23 U/L (ref 0–31)
BILIRUB SERPL-MCNC: 0.6 MG/DL (ref 0–1.2)
BUN BLDV-MCNC: 16 MG/DL (ref 6–23)
CALCIUM SERPL-MCNC: 8.7 MG/DL (ref 8.6–10.2)
CHLORIDE BLD-SCNC: 102 MMOL/L (ref 98–107)
CHOLESTEROL, TOTAL: 153 MG/DL
CO2: 21 MMOL/L (ref 22–29)
CREAT SERPL-MCNC: 1 MG/DL (ref 0.5–1)
CREATININE URINE: 91 MG/DL (ref 29–226)
FOLATE: 16.3 NG/ML (ref 4.8–24.2)
GFR, ESTIMATED: 54 ML/MIN/1.73M2
GLUCOSE BLD-MCNC: 86 MG/DL (ref 74–99)
HCT VFR BLD CALC: 39.7 % (ref 34–48)
HDLC SERPL-MCNC: 41 MG/DL
HEMOGLOBIN: 12.3 G/DL (ref 11.5–15.5)
LDL CHOLESTEROL: 90 MG/DL
MCH RBC QN AUTO: 28.1 PG (ref 26–35)
MCHC RBC AUTO-ENTMCNC: 31 G/DL (ref 32–34.5)
MCV RBC AUTO: 90.8 FL (ref 80–99.9)
MICROALBUMIN/CREAT 24H UR: <12 MG/L (ref 0–19)
MICROALBUMIN/CREAT UR-RTO: NORMAL MCG/MG CREAT (ref 0–30)
PDW BLD-RTO: 14.1 % (ref 11.5–15)
PLATELET # BLD: 168 K/UL (ref 130–450)
PMV BLD AUTO: 11.7 FL (ref 7–12)
POTASSIUM SERPL-SCNC: 5.1 MMOL/L (ref 3.5–5)
RBC # BLD: 4.37 M/UL (ref 3.5–5.5)
SODIUM BLD-SCNC: 142 MMOL/L (ref 132–146)
TOTAL PROTEIN: 7 G/DL (ref 6.4–8.3)
TRIGL SERPL-MCNC: 108 MG/DL
TSH SERPL DL<=0.05 MIU/L-ACNC: 2.84 UIU/ML (ref 0.27–4.2)
VITAMIN B-12: 667 PG/ML (ref 211–946)
VITAMIN D 25-HYDROXY: 43.8 NG/ML (ref 30–100)
VLDLC SERPL CALC-MCNC: 22 MG/DL
WBC # BLD: 6.9 K/UL (ref 4.5–11.5)

## 2024-06-25 PROCEDURE — 99214 OFFICE O/P EST MOD 30 MIN: CPT | Performed by: STUDENT IN AN ORGANIZED HEALTH CARE EDUCATION/TRAINING PROGRAM

## 2024-06-25 PROCEDURE — G0439 PPPS, SUBSEQ VISIT: HCPCS | Performed by: STUDENT IN AN ORGANIZED HEALTH CARE EDUCATION/TRAINING PROGRAM

## 2024-06-25 PROCEDURE — 36415 COLL VENOUS BLD VENIPUNCTURE: CPT | Performed by: STUDENT IN AN ORGANIZED HEALTH CARE EDUCATION/TRAINING PROGRAM

## 2024-06-25 PROCEDURE — 1123F ACP DISCUSS/DSCN MKR DOCD: CPT | Performed by: STUDENT IN AN ORGANIZED HEALTH CARE EDUCATION/TRAINING PROGRAM

## 2024-06-25 RX ORDER — AMLODIPINE BESYLATE 5 MG/1
5 TABLET ORAL DAILY
Qty: 90 TABLET | Refills: 1 | Status: SHIPPED | OUTPATIENT
Start: 2024-06-25

## 2024-06-25 RX ORDER — ATORVASTATIN CALCIUM 20 MG/1
20 TABLET, FILM COATED ORAL EVERY OTHER DAY
Qty: 45 TABLET | Refills: 1 | Status: SHIPPED | OUTPATIENT
Start: 2024-06-25

## 2024-06-25 RX ORDER — GABAPENTIN 100 MG/1
200 CAPSULE ORAL NIGHTLY
Qty: 180 CAPSULE | Refills: 1 | Status: SHIPPED | OUTPATIENT
Start: 2024-06-25 | End: 2024-12-22

## 2024-06-25 RX ORDER — LOSARTAN POTASSIUM 100 MG/1
100 TABLET ORAL DAILY
Qty: 90 TABLET | Refills: 1 | Status: SHIPPED | OUTPATIENT
Start: 2024-06-25

## 2024-06-25 RX ORDER — SUVOREXANT 20 MG/1
20 TABLET, FILM COATED ORAL NIGHTLY
Qty: 90 TABLET | Refills: 1 | Status: SHIPPED | OUTPATIENT
Start: 2024-06-25 | End: 2024-12-22

## 2024-06-25 RX ORDER — SUVOREXANT 10 MG/1
1 TABLET, FILM COATED ORAL NIGHTLY
Qty: 90 TABLET | Refills: 1 | Status: CANCELLED | OUTPATIENT
Start: 2024-06-25 | End: 2024-12-22

## 2024-06-25 ASSESSMENT — PATIENT HEALTH QUESTIONNAIRE - PHQ9
SUM OF ALL RESPONSES TO PHQ QUESTIONS 1-9: 6
7. TROUBLE CONCENTRATING ON THINGS, SUCH AS READING THE NEWSPAPER OR WATCHING TELEVISION: NOT AT ALL
2. FEELING DOWN, DEPRESSED OR HOPELESS: NOT AT ALL
SUM OF ALL RESPONSES TO PHQ QUESTIONS 1-9: 6
10. IF YOU CHECKED OFF ANY PROBLEMS, HOW DIFFICULT HAVE THESE PROBLEMS MADE IT FOR YOU TO DO YOUR WORK, TAKE CARE OF THINGS AT HOME, OR GET ALONG WITH OTHER PEOPLE: NOT DIFFICULT AT ALL
5. POOR APPETITE OR OVEREATING: NOT AT ALL
4. FEELING TIRED OR HAVING LITTLE ENERGY: NEARLY EVERY DAY
8. MOVING OR SPEAKING SO SLOWLY THAT OTHER PEOPLE COULD HAVE NOTICED. OR THE OPPOSITE, BEING SO FIGETY OR RESTLESS THAT YOU HAVE BEEN MOVING AROUND A LOT MORE THAN USUAL: NOT AT ALL
SUM OF ALL RESPONSES TO PHQ QUESTIONS 1-9: 6
9. THOUGHTS THAT YOU WOULD BE BETTER OFF DEAD, OR OF HURTING YOURSELF: NOT AT ALL
3. TROUBLE FALLING OR STAYING ASLEEP: NEARLY EVERY DAY
SUM OF ALL RESPONSES TO PHQ QUESTIONS 1-9: 6
6. FEELING BAD ABOUT YOURSELF - OR THAT YOU ARE A FAILURE OR HAVE LET YOURSELF OR YOUR FAMILY DOWN: NOT AT ALL
SUM OF ALL RESPONSES TO PHQ9 QUESTIONS 1 & 2: 0
1. LITTLE INTEREST OR PLEASURE IN DOING THINGS: NOT AT ALL

## 2024-06-25 ASSESSMENT — ENCOUNTER SYMPTOMS
BACK PAIN: 0
COUGH: 0
SHORTNESS OF BREATH: 0

## 2024-06-25 ASSESSMENT — LIFESTYLE VARIABLES
HOW MANY STANDARD DRINKS CONTAINING ALCOHOL DO YOU HAVE ON A TYPICAL DAY: PATIENT DOES NOT DRINK
HOW OFTEN DO YOU HAVE A DRINK CONTAINING ALCOHOL: NEVER

## 2024-06-25 NOTE — ASSESSMENT & PLAN NOTE
Chronic, uncontrolled  Failed magnesium, trazodone, Remeron  Doxepin not covered by insurance  Increase Belsomra 20 mg nightly  OARRS appropriate  CSA signed  UDS collected

## 2024-06-25 NOTE — PROGRESS NOTES
Lab draw right AC 22 x 1 1/4\". Venipuncture x 1   
Vitamin D 25 Hydroxy     Standing Status:   Future     Standing Expiration Date:   6/25/2025    Vitamin B12 & Folate     Standing Status:   Future     Standing Expiration Date:   6/25/2025      No results found for this or any previous visit (from the past 24 hour(s)).

## 2024-06-25 NOTE — PROGRESS NOTES
Medicare Annual Wellness Visit    Iesha Meneses is here for Medicare AWV    Assessment & Plan   Medicare annual wellness visit, subsequent  -     Full code  Counseling regarding advanced directives and goals of care  -     Full code    Recommendations for Preventive Services Due: see orders and patient instructions/AVS.  Recommended screening schedule for the next 5-10 years is provided to the patient in written form: see Patient Instructions/AVS.     Return for Medicare Annual Wellness Visit in 1 year.     Subjective   Patient's complete Health Risk Assessment and screening values have been reviewed and are found in Flowsheets. The following problems were reviewed today and where indicated follow up appointments were made and/or referrals ordered.    Positive Risk Factor Screenings with Interventions:      Depression:  PHQ-2 Score: 0  PHQ-9 Total Score: 6    Interpretation:  5-9 mild   10-14 moderate   15-19 moderately severe   20-27 severe   Interventions:  Due to insomnia, see other visit; other depression screening questions negative              Vision Screen:  Do you have difficulty driving, watching TV, or doing any of your daily activities because of your eyesight?: (!) Yes  Have you had an eye exam within the past year?: Yes  No results found.  Interventions:   Patient is following with their eye specialist    Safety:  Do you have working smoke detectors?: (!) No (unsure, lives in villa at [facility])  Interventions:  Advised [place] likely has smoke detectors due to risk of liability, but advised to ask maintenance           Objective   Vitals:    06/25/24 1400   BP: 122/82   Site: Right Upper Arm   Position: Sitting   Cuff Size: Large Adult   Pulse: 73   SpO2: 95%   Weight: 72 kg (158 lb 12.8 oz)   Height: 1.6 m (5' 3\")      Body mass index is 28.13 kg/m².               Allergies   Allergen Reactions    Propoxyphene      Prior to Visit Medications    Medication Sig Taking? Authorizing Provider

## 2024-06-26 LAB
6-MONOACETYLMORPHINE, URINE: NEGATIVE
ABNORMAL SPECIMEN VALIDITY TEST: NORMAL
ALCOHOL URINE: NOT DETECTED MG/DL
AMPHETAMINE SCREEN URINE: NEGATIVE
BARBITURATE SCREEN URINE: NEGATIVE
BENZODIAZEPINE SCREEN, URINE: NEGATIVE
BUPRENORPHINE URINE: NEGATIVE
CANNABINOID SCREEN URINE: NEGATIVE
COCAINE METABOLITE, URINE: NEGATIVE
FENTANYL URINE: NEGATIVE
INTEGRITY CHECK, CREATININE, URINE: 92 MG/DL (ref 22–250)
INTEGRITY CHECK, OXIDANT, URINE: <40 MG/L
INTEGRITY CHECK, PH, URINE: 6.8 (ref 4.5–9)
INTEGRITY CHECK, SPECIFIC GRAVITY, URINE: 1.01 (ref 1–1.03)
METHADONE SCREEN, URINE: NEGATIVE
OPIATES, URINE: NEGATIVE
OXYCODONE SCREEN URINE: NEGATIVE
PCP,URINE: NEGATIVE
TEST INFORMATION: NORMAL
TRAMADOL, URINE: NEGATIVE

## 2024-08-02 LAB
ALBUMIN: NORMAL
ALP BLD-CCNC: NORMAL U/L
ALT SERPL-CCNC: NORMAL U/L
ANION GAP SERPL CALCULATED.3IONS-SCNC: NORMAL MMOL/L
AST SERPL-CCNC: NORMAL U/L
BASOPHILS ABSOLUTE: NORMAL
BASOPHILS RELATIVE PERCENT: NORMAL
BILIRUB SERPL-MCNC: NORMAL MG/DL
BUN BLDV-MCNC: NORMAL MG/DL
CALCIUM SERPL-MCNC: NORMAL MG/DL
CHLORIDE BLD-SCNC: NORMAL MMOL/L
CO2: NORMAL
CREAT SERPL-MCNC: 1.2 MG/DL
EOSINOPHILS ABSOLUTE: NORMAL
EOSINOPHILS RELATIVE PERCENT: NORMAL
GFR, ESTIMATED: 46
GLUCOSE BLD-MCNC: NORMAL MG/DL
HCT VFR BLD CALC: NORMAL %
HEMOGLOBIN: NORMAL
LYMPHOCYTES ABSOLUTE: NORMAL
LYMPHOCYTES RELATIVE PERCENT: NORMAL
MCH RBC QN AUTO: NORMAL PG
MCHC RBC AUTO-ENTMCNC: NORMAL G/DL
MCV RBC AUTO: NORMAL FL
MONOCYTES ABSOLUTE: NORMAL
MONOCYTES RELATIVE PERCENT: NORMAL
NEUTROPHILS ABSOLUTE: NORMAL
NEUTROPHILS RELATIVE PERCENT: NORMAL
PDW BLD-RTO: NORMAL %
PLATELET # BLD: NORMAL 10*3/UL
PMV BLD AUTO: NORMAL FL
POTASSIUM SERPL-SCNC: NORMAL MMOL/L
RBC # BLD: NORMAL 10*6/UL
SODIUM BLD-SCNC: NORMAL MMOL/L
TOTAL PROTEIN: NORMAL
WBC # BLD: NORMAL 10*3/UL

## 2024-08-05 ENCOUNTER — TELEPHONE (OUTPATIENT)
Dept: PRIMARY CARE CLINIC | Age: 89
End: 2024-08-05

## 2024-08-05 NOTE — TELEPHONE ENCOUNTER
Called Pt's  for clarification. He stated Pt's ribs are still really hurting and is very weak,  but vomiting has stopped. I advised if chest pain begins and vomiting happens again, go to the ER. Appointment scheduled for 08/06/24 11:30 a.m.

## 2024-08-05 NOTE — TELEPHONE ENCOUNTER
Spoke to  yesterday when he called after hours line to request appointment. I advised him I could not schedule her and that he would have to call office today for appointment. He reported her chest pain and vomiting had resolved-please confirm this today. He did report she was still tired and had low appetite. We discussed proper hydration which he stated he would help her with. If still having chest pain and vomiting, needs to return to ED. She can be scheduled tomorrow 1130 am otherwise.

## 2024-08-05 NOTE — TELEPHONE ENCOUNTER
Pt's  called to tell us Pt was at [facility] for vomiting, chest pain and arm pain. BP of 180/94. Pt stated on VM that she is to see her primary care provider right away and symptoms have not subsided.  Please advise.

## 2024-08-05 NOTE — TELEPHONE ENCOUNTER
Pt's daughter called to notify of her concern for both of her parents. Daughter stated both parents have a cognitive decline. Repeating things over and over again. She wasn't sure if there is anything we can do or have tests done.  She is worried but doesn't want us to tell them about the phone call.

## 2024-08-06 ENCOUNTER — OFFICE VISIT (OUTPATIENT)
Dept: PRIMARY CARE CLINIC | Age: 89
End: 2024-08-06
Payer: MEDICARE

## 2024-08-06 VITALS
WEIGHT: 154 LBS | HEIGHT: 63 IN | OXYGEN SATURATION: 95 % | BODY MASS INDEX: 27.29 KG/M2 | HEART RATE: 92 BPM | TEMPERATURE: 98.3 F | SYSTOLIC BLOOD PRESSURE: 104 MMHG | DIASTOLIC BLOOD PRESSURE: 64 MMHG

## 2024-08-06 DIAGNOSIS — N18.31 STAGE 3A CHRONIC KIDNEY DISEASE (HCC): ICD-10-CM

## 2024-08-06 DIAGNOSIS — I10 ESSENTIAL HYPERTENSION: ICD-10-CM

## 2024-08-06 DIAGNOSIS — R10.11 RIGHT UPPER QUADRANT ABDOMINAL PAIN: ICD-10-CM

## 2024-08-06 DIAGNOSIS — R07.89 ATYPICAL CHEST PAIN: ICD-10-CM

## 2024-08-06 DIAGNOSIS — R11.2 NAUSEA AND VOMITING, UNSPECIFIED VOMITING TYPE: ICD-10-CM

## 2024-08-06 DIAGNOSIS — R10.11 RIGHT UPPER QUADRANT ABDOMINAL PAIN: Primary | ICD-10-CM

## 2024-08-06 LAB — LIPASE: 29 U/L (ref 13–60)

## 2024-08-06 PROCEDURE — 1123F ACP DISCUSS/DSCN MKR DOCD: CPT | Performed by: STUDENT IN AN ORGANIZED HEALTH CARE EDUCATION/TRAINING PROGRAM

## 2024-08-06 PROCEDURE — 36415 COLL VENOUS BLD VENIPUNCTURE: CPT | Performed by: STUDENT IN AN ORGANIZED HEALTH CARE EDUCATION/TRAINING PROGRAM

## 2024-08-06 PROCEDURE — 99214 OFFICE O/P EST MOD 30 MIN: CPT | Performed by: STUDENT IN AN ORGANIZED HEALTH CARE EDUCATION/TRAINING PROGRAM

## 2024-08-06 PROCEDURE — G2211 COMPLEX E/M VISIT ADD ON: HCPCS | Performed by: STUDENT IN AN ORGANIZED HEALTH CARE EDUCATION/TRAINING PROGRAM

## 2024-08-06 RX ORDER — TRAMADOL HYDROCHLORIDE 50 MG/1
50 TABLET ORAL EVERY 6 HOURS PRN
COMMUNITY
Start: 2024-08-03 | End: 2024-08-06

## 2024-08-06 RX ORDER — ONDANSETRON 4 MG/1
TABLET, ORALLY DISINTEGRATING ORAL
COMMUNITY
Start: 2024-08-03 | End: 2024-08-06

## 2024-08-06 ASSESSMENT — ENCOUNTER SYMPTOMS
DIARRHEA: 0
VOMITING: 0
SHORTNESS OF BREATH: 0
BLOOD IN STOOL: 0
ABDOMINAL DISTENTION: 0
WHEEZING: 0
NAUSEA: 1
CONSTIPATION: 0
COUGH: 1
ABDOMINAL PAIN: 1

## 2024-08-06 NOTE — PROGRESS NOTES
ESTABLISHED PRIMARY CARE VISIT    24  Name: Iesah Meneses   : 1932   Age: 89 y.o.  Sex: female        Assessment & Plan:       ICD-10-CM    1. Right upper quadrant abdominal pain  R10.11 US ABDOMEN LIMITED     Lipase     Lipase      2. Atypical chest pain  R07.89       3. Nausea and vomiting, unspecified vomiting type  R11.2 Lipase     Lipase      4. Essential hypertension  I10       5. Stage 3a chronic kidney disease (HCC)  N18.31         Patient seen today for ED follow-up for chest pain, nausea, vomiting today. Chest pain and vomiting resolved. Pain appears to be more localized to RUQ and epigastric area today. Still with significant pain with deep breaths and with palpation with positive Salinas sign. CBC, CMP, troponin, EKG, CTPA normal in ED. Appears comfortable at rest. Vitals stable today. Not taking Tramadol or Zofran from ED, does not feel she needs these at this time. Differential includes cholecystitis, cholelithiasis, viral gastroenteritis, gastritis/duodenitis, liver or pancreatic lesion. Check lipase and stat upper abdominal US today. Counseled on hydration.    Chronic hypertension normalized today. Continue amlodipine 5 mg daily, losartan 100 mg daily.    CKD 3a stable in ED. Avoid nephrotoxic medications as able. Maintain hydration.    Counseled patient regarding above diagnosis, including possible risks and complications, especially if left uncontrolled.  Counseled patient as appropriate and relevant regarding any possible side effects, risks, and alternatives to treatment; the patient verbalizes understanding, and is in agreement with the plan as detailed above.   All educational materials and instructions were discussed and included on the After Visit Summary.  All questions answered to the patient's satisfaction.  The patient was advised to call for any concerns prior to next appointment.    Return if symptoms worsen or fail to improve.    This visit is inherently complex due

## 2024-08-06 NOTE — TELEPHONE ENCOUNTER
Passed cognitive screening at medicare wellness in June. If new or significant concerns (not exhibited during visit today, patient was alert and oriented), can pursue more in depth memory testing at upcoming visit.

## 2024-08-08 ENCOUNTER — TELEPHONE (OUTPATIENT)
Dept: PRIMARY CARE CLINIC | Age: 89
End: 2024-08-08

## 2024-08-08 NOTE — TELEPHONE ENCOUNTER
called in asking for clarification on results from her having rib pain / gallbladder pain, please call  with results     There is an US in the chart from 8/6/24

## 2024-08-13 ENCOUNTER — TELEPHONE (OUTPATIENT)
Dept: PRIMARY CARE CLINIC | Age: 89
End: 2024-08-13

## 2024-08-13 NOTE — TELEPHONE ENCOUNTER
Daughter called for update on mother. Per HIPAA form. Read the US report for gallbladder and office notes. Daughter showed understanding. Advised to have a family meeting for memory loss, driving and living situation. .

## 2024-09-26 ENCOUNTER — OFFICE VISIT (OUTPATIENT)
Dept: PRIMARY CARE CLINIC | Age: 89
End: 2024-09-26
Payer: MEDICARE

## 2024-09-26 VITALS
WEIGHT: 159.2 LBS | HEIGHT: 63 IN | OXYGEN SATURATION: 96 % | BODY MASS INDEX: 28.21 KG/M2 | SYSTOLIC BLOOD PRESSURE: 128 MMHG | HEART RATE: 77 BPM | DIASTOLIC BLOOD PRESSURE: 77 MMHG | RESPIRATION RATE: 17 BRPM | TEMPERATURE: 97 F

## 2024-09-26 DIAGNOSIS — G47.00 INSOMNIA, UNSPECIFIED TYPE: Primary | ICD-10-CM

## 2024-09-26 DIAGNOSIS — J30.9 ALLERGIC RHINITIS WITH POSTNASAL DRIP: ICD-10-CM

## 2024-09-26 DIAGNOSIS — N18.31 STAGE 3A CHRONIC KIDNEY DISEASE (HCC): ICD-10-CM

## 2024-09-26 DIAGNOSIS — Z23 NEED FOR IMMUNIZATION AGAINST INFLUENZA: ICD-10-CM

## 2024-09-26 DIAGNOSIS — R09.82 ALLERGIC RHINITIS WITH POSTNASAL DRIP: ICD-10-CM

## 2024-09-26 DIAGNOSIS — G25.81 RESTLESS LEG SYNDROME: ICD-10-CM

## 2024-09-26 PROCEDURE — G0008 ADMIN INFLUENZA VIRUS VAC: HCPCS | Performed by: STUDENT IN AN ORGANIZED HEALTH CARE EDUCATION/TRAINING PROGRAM

## 2024-09-26 PROCEDURE — 99214 OFFICE O/P EST MOD 30 MIN: CPT | Performed by: STUDENT IN AN ORGANIZED HEALTH CARE EDUCATION/TRAINING PROGRAM

## 2024-09-26 PROCEDURE — 90653 IIV ADJUVANT VACCINE IM: CPT | Performed by: STUDENT IN AN ORGANIZED HEALTH CARE EDUCATION/TRAINING PROGRAM

## 2024-09-26 PROCEDURE — 1123F ACP DISCUSS/DSCN MKR DOCD: CPT | Performed by: STUDENT IN AN ORGANIZED HEALTH CARE EDUCATION/TRAINING PROGRAM

## 2024-09-26 RX ORDER — GABAPENTIN 300 MG/1
300 CAPSULE ORAL NIGHTLY
Qty: 90 CAPSULE | Refills: 1 | Status: SHIPPED | OUTPATIENT
Start: 2024-09-26 | End: 2025-03-25

## 2024-09-26 RX ORDER — RAMELTEON 8 MG/1
8 TABLET ORAL NIGHTLY
Qty: 90 TABLET | Refills: 1 | Status: SHIPPED | OUTPATIENT
Start: 2024-09-26 | End: 2025-03-25

## 2024-09-26 ASSESSMENT — ENCOUNTER SYMPTOMS
DIARRHEA: 0
COUGH: 1
NAUSEA: 0
BLOOD IN STOOL: 0
VOMITING: 0
SHORTNESS OF BREATH: 0
ABDOMINAL PAIN: 0
ABDOMINAL DISTENTION: 0
CONSTIPATION: 0
WHEEZING: 0

## 2024-10-29 ENCOUNTER — TELEPHONE (OUTPATIENT)
Dept: PRIMARY CARE CLINIC | Age: 89
End: 2024-10-29

## 2024-10-29 ENCOUNTER — OFFICE VISIT (OUTPATIENT)
Dept: PRIMARY CARE CLINIC | Age: 89
End: 2024-10-29

## 2024-10-29 VITALS
TEMPERATURE: 97.7 F | SYSTOLIC BLOOD PRESSURE: 124 MMHG | OXYGEN SATURATION: 96 % | DIASTOLIC BLOOD PRESSURE: 80 MMHG | HEIGHT: 63 IN | BODY MASS INDEX: 27.43 KG/M2 | HEART RATE: 90 BPM | WEIGHT: 154.8 LBS

## 2024-10-29 DIAGNOSIS — I10 ESSENTIAL HYPERTENSION: ICD-10-CM

## 2024-10-29 DIAGNOSIS — E03.9 ACQUIRED HYPOTHYROIDISM: ICD-10-CM

## 2024-10-29 DIAGNOSIS — I50.31 ACUTE DIASTOLIC (CONGESTIVE) HEART FAILURE (HCC): ICD-10-CM

## 2024-10-29 DIAGNOSIS — Z09 HOSPITAL DISCHARGE FOLLOW-UP: Primary | ICD-10-CM

## 2024-10-29 DIAGNOSIS — I48.92 ATRIAL FLUTTER WITH RAPID VENTRICULAR RESPONSE (HCC): ICD-10-CM

## 2024-10-29 PROBLEM — K80.20 CALCULUS OF GALLBLADDER WITHOUT CHOLECYSTITIS: Status: ACTIVE | Noted: 2024-10-29

## 2024-10-29 RX ORDER — FUROSEMIDE 40 MG/1
40 TABLET ORAL DAILY
Qty: 90 TABLET | Refills: 0 | Status: SHIPPED | OUTPATIENT
Start: 2024-10-29

## 2024-10-29 RX ORDER — SPIRONOLACTONE 25 MG/1
25 TABLET ORAL DAILY
Qty: 90 TABLET | Refills: 0 | Status: SHIPPED | OUTPATIENT
Start: 2024-10-29

## 2024-10-29 RX ORDER — FUROSEMIDE 40 MG/1
TABLET ORAL
COMMUNITY
Start: 2024-10-29 | End: 2024-10-29 | Stop reason: SDUPTHER

## 2024-10-29 RX ORDER — METOPROLOL SUCCINATE 50 MG/1
TABLET, EXTENDED RELEASE ORAL
COMMUNITY
Start: 2024-10-29 | End: 2024-10-29 | Stop reason: SDUPTHER

## 2024-10-29 RX ORDER — SPIRONOLACTONE 25 MG/1
TABLET ORAL
COMMUNITY
Start: 2024-10-29 | End: 2024-10-29 | Stop reason: SDUPTHER

## 2024-10-29 RX ORDER — METOPROLOL SUCCINATE 50 MG/1
50 TABLET, EXTENDED RELEASE ORAL DAILY
Qty: 90 TABLET | Refills: 0 | Status: SHIPPED | OUTPATIENT
Start: 2024-10-29

## 2024-10-29 ASSESSMENT — ENCOUNTER SYMPTOMS
DIARRHEA: 0
SHORTNESS OF BREATH: 0
CONSTIPATION: 0
BLOOD IN STOOL: 0
ABDOMINAL PAIN: 0
WHEEZING: 0
NAUSEA: 0
COUGH: 1
ABDOMINAL DISTENTION: 0
VOMITING: 0

## 2024-10-29 NOTE — PROGRESS NOTES
Post-Discharge Transitional Care Management Progress Note      Iesha Meneses   YOB: 1932    Date of Office Visit:  10/29/2024  Date of Hospital Admission: 10/27/2024  Date of Hospital Discharge: 10/29/2024    Care management risk score Rising risk (score 2-5) and Complex Care (Scores >=6): No Risk Score On File     Non face to face  following discharge, date last encounter closed (first attempt may have been earlier): *No documented post hospital discharge outreach found in the last 14 days *No documented post hospital discharge outreach found in the last 14 days    Call initiated 2 business days of discharge: Yes    ASSESSMENT/PLAN:   Hospital discharge follow-up  -     SC DISCHARGE MEDS RECONCILED W/ CURRENT OUTPATIENT MED LIST  Atrial flutter with rapid ventricular response (HCC)  -     metoprolol succinate (TOPROL XL) 50 MG extended release tablet; Take 1 tablet by mouth daily, Disp-90 tablet, R-0Normal  -     apixaban (ELIQUIS) 5 MG TABS tablet; Take 1 tablet by mouth 2 times daily, Disp-180 tablet, R-0Normal  -     Basic Metabolic Panel; Future  -     TSH; Future  Acute diastolic (congestive) heart failure (HCC)  -     furosemide (LASIX) 40 MG tablet; Take 1 tablet by mouth daily, Disp-90 tablet, R-0Normal  -     spironolactone (ALDACTONE) 25 MG tablet; Take 1 tablet by mouth daily, Disp-90 tablet, R-0Normal  -     metoprolol succinate (TOPROL XL) 50 MG extended release tablet; Take 1 tablet by mouth daily, Disp-90 tablet, R-0Normal  Essential hypertension  Acquired hypothyroidism  -     TSH; Future    Patient returns for follow-up. Atrial flutter currently rate- and rhythm-controlled. Hypervolemia appears to be resolved. Blood pressure at goal. Recheck BMP on diuretics. Check TSH also given history of hypothyroidism and was not checked during admission. Follow up with cardiology Dr. Dakota Pringle. Advised to schedule.    Medical Decision Making: moderate complexity    Return in about 2

## 2024-10-29 NOTE — TELEPHONE ENCOUNTER
Care Transitions Initial Follow Up Call    Outreach made within 2 business days of discharge: Yes    Patient: Iesha Meneses Patient : 1932   MRN: [id number]  Reason for Admission: swelling legs  Discharge Date:         Spoke with:  Alexei     Discharge department/facility: [facility]    TCM Interactive Patient Contact:  Was patient able to fill all prescriptions: Yes  Was patient instructed to bring all medications to the follow-up visit: Yes  Is patient taking all medications as directed in the discharge summary? Yes  Does patient understand their discharge instructions: Yes  Does patient have questions or concerns that need addressed prior to 7-14 day follow up office visit: yes - Appointment made for today     Additional needs identified to be addressed with provider  No needs identified             Scheduled appointment with PCP within 7-14 days    Follow Up  Future Appointments   Date Time Provider Department Center   2025  2:30 PM Juan Meneses MD [facility] DEP   3/27/2025  2:30 PM Juan Meneses MD [facility]   3/27/2025  3:00 PM Juan Meneses MD [facility] DEP       Enedina Wilson LPN

## 2024-11-04 ENCOUNTER — TELEPHONE (OUTPATIENT)
Dept: PRIMARY CARE CLINIC | Age: 89
End: 2024-11-04

## 2024-11-04 NOTE — TELEPHONE ENCOUNTER
Pt's  is concerned because metoprolol says on the pharmacy papers that you should not use if you have a heart condition. He wants to know what both him and his wife should and should not take for their medications.  He has \"another 7 questions about medication\"   Please Advise.

## 2024-11-04 NOTE — TELEPHONE ENCOUNTER
called back again and left another mesasge, he is confused on if wife should be taking Metoprolol.

## 2024-11-04 NOTE — TELEPHONE ENCOUNTER
Patient should continue all medications as discussed at her appointment per AVS. Metoprolol is used almost exclusively for heart conditions.

## 2024-11-05 NOTE — TELEPHONE ENCOUNTER
Dr. Meneses spoke with  and re-assured him that she is to take the Metoprolol as prescribed and is to come in tomorrow for labs and vitals.

## 2024-11-06 ENCOUNTER — LAB (OUTPATIENT)
Dept: PRIMARY CARE CLINIC | Age: 89
End: 2024-11-06
Payer: MEDICARE

## 2024-11-06 VITALS
SYSTOLIC BLOOD PRESSURE: 110 MMHG | OXYGEN SATURATION: 96 % | DIASTOLIC BLOOD PRESSURE: 64 MMHG | TEMPERATURE: 97.8 F | HEART RATE: 69 BPM

## 2024-11-06 DIAGNOSIS — E78.5 HYPERLIPIDEMIA, UNSPECIFIED HYPERLIPIDEMIA TYPE: Primary | ICD-10-CM

## 2024-11-06 DIAGNOSIS — I48.92 ATRIAL FLUTTER WITH RAPID VENTRICULAR RESPONSE (HCC): ICD-10-CM

## 2024-11-06 DIAGNOSIS — I10 ESSENTIAL HYPERTENSION: ICD-10-CM

## 2024-11-06 DIAGNOSIS — E03.9 ACQUIRED HYPOTHYROIDISM: ICD-10-CM

## 2024-11-06 LAB
ANION GAP SERPL CALCULATED.3IONS-SCNC: 9 MMOL/L (ref 7–16)
BUN BLDV-MCNC: 23 MG/DL (ref 6–23)
CALCIUM SERPL-MCNC: 8.9 MG/DL (ref 8.6–10.2)
CHLORIDE BLD-SCNC: 105 MMOL/L (ref 98–107)
CO2: 27 MMOL/L (ref 22–29)
CREAT SERPL-MCNC: 1.1 MG/DL (ref 0.5–1)
GFR, ESTIMATED: 46 ML/MIN/1.73M2
GLUCOSE BLD-MCNC: 104 MG/DL (ref 74–99)
POTASSIUM SERPL-SCNC: 5 MMOL/L (ref 3.5–5)
SODIUM BLD-SCNC: 141 MMOL/L (ref 132–146)
TSH SERPL DL<=0.05 MIU/L-ACNC: 5.62 UIU/ML (ref 0.27–4.2)

## 2024-11-06 PROCEDURE — 36415 COLL VENOUS BLD VENIPUNCTURE: CPT | Performed by: STUDENT IN AN ORGANIZED HEALTH CARE EDUCATION/TRAINING PROGRAM

## 2024-11-07 DIAGNOSIS — E03.9 ACQUIRED HYPOTHYROIDISM: ICD-10-CM

## 2024-11-07 DIAGNOSIS — E03.9 ACQUIRED HYPOTHYROIDISM: Primary | ICD-10-CM

## 2024-11-07 LAB — T4 FREE: 1.5 NG/DL (ref 0.9–1.7)

## 2024-11-12 DIAGNOSIS — I48.92 ATRIAL FLUTTER WITH RAPID VENTRICULAR RESPONSE (HCC): ICD-10-CM

## 2024-11-12 DIAGNOSIS — Z09 HOSPITAL DISCHARGE FOLLOW-UP: Primary | ICD-10-CM

## 2024-11-12 DIAGNOSIS — I50.31 ACUTE DIASTOLIC (CONGESTIVE) HEART FAILURE (HCC): ICD-10-CM

## 2024-12-06 ENCOUNTER — TELEPHONE (OUTPATIENT)
Dept: PRIMARY CARE CLINIC | Age: 88
End: 2024-12-06

## 2024-12-06 NOTE — TELEPHONE ENCOUNTER
I called and spoke with the pharmacist at [facility], the patient is in the donut hole for medications.  The  was at the pharmacy currently and will explain to him the situation.  The pharmacist will give him enough Eliquist to go to the end of the year and then beginning 01/01/25, she can start over with her insurance.  I called and left a message for Juliette to call the office back if she had any questions.

## 2024-12-06 NOTE — TELEPHONE ENCOUNTER
Pts daughter called in states insurance is no longer covering eliquis, we will need alternative medication sent into pharmacy, pt advised she will need to come the the office periodically to get her inr checked.     Pts pharmacy     Pt only has 2 doses left of eliquis

## 2024-12-17 ENCOUNTER — OFFICE VISIT (OUTPATIENT)
Dept: PRIMARY CARE CLINIC | Age: 88
End: 2024-12-17
Payer: MEDICARE

## 2024-12-17 VITALS
SYSTOLIC BLOOD PRESSURE: 122 MMHG | HEART RATE: 62 BPM | OXYGEN SATURATION: 99 % | TEMPERATURE: 98.1 F | HEIGHT: 63 IN | BODY MASS INDEX: 26.4 KG/M2 | RESPIRATION RATE: 16 BRPM | DIASTOLIC BLOOD PRESSURE: 76 MMHG | WEIGHT: 149 LBS

## 2024-12-17 DIAGNOSIS — I48.92 ATRIAL FLUTTER WITH RAPID VENTRICULAR RESPONSE (HCC): ICD-10-CM

## 2024-12-17 DIAGNOSIS — N18.31 STAGE 3A CHRONIC KIDNEY DISEASE (HCC): ICD-10-CM

## 2024-12-17 DIAGNOSIS — E03.9 ACQUIRED HYPOTHYROIDISM: ICD-10-CM

## 2024-12-17 DIAGNOSIS — G47.00 INSOMNIA, UNSPECIFIED TYPE: ICD-10-CM

## 2024-12-17 DIAGNOSIS — I10 ESSENTIAL HYPERTENSION: Primary | ICD-10-CM

## 2024-12-17 PROCEDURE — 99214 OFFICE O/P EST MOD 30 MIN: CPT | Performed by: NURSE PRACTITIONER

## 2024-12-17 PROCEDURE — 1160F RVW MEDS BY RX/DR IN RCRD: CPT | Performed by: NURSE PRACTITIONER

## 2024-12-17 PROCEDURE — 1159F MED LIST DOCD IN RCRD: CPT | Performed by: NURSE PRACTITIONER

## 2024-12-17 PROCEDURE — 1123F ACP DISCUSS/DSCN MKR DOCD: CPT | Performed by: NURSE PRACTITIONER

## 2024-12-17 ASSESSMENT — ENCOUNTER SYMPTOMS
WHEEZING: 0
CHOKING: 0
EYE ITCHING: 0
EYE DISCHARGE: 0
SHORTNESS OF BREATH: 0
PHOTOPHOBIA: 0
ALLERGIC/IMMUNOLOGIC NEGATIVE: 1
COUGH: 1
STRIDOR: 0
GASTROINTESTINAL NEGATIVE: 1
CHEST TIGHTNESS: 0
APNEA: 0
EYE REDNESS: 0
EYE PAIN: 0

## 2024-12-17 NOTE — PROGRESS NOTES
24  Iesha Meneses : 1932 Sex: female  Age: 89 y.o.    Chief Complaint   Patient presents with    2 month f/u        Here today for follow-up appt.  Follow with Dr. Pringle and states he does the blood work. She has an upcoming appt 2025.          Review of Systems   Constitutional: Negative.    HENT:  Positive for hearing loss (uses hearing aids).    Eyes:  Positive for visual disturbance (Macular degeneration). Negative for photophobia, pain, discharge, redness and itching.   Respiratory:  Positive for cough (Chronic Dry Cough for many years). Negative for apnea, choking, chest tightness, shortness of breath, wheezing and stridor.    Cardiovascular: Negative.    Gastrointestinal: Negative.    Endocrine: Negative.    Genitourinary:  Positive for urgency (overactive bladder). Negative for decreased urine volume, difficulty urinating, dyspareunia, dysuria, enuresis, flank pain, frequency, genital sores, hematuria, menstrual problem, pelvic pain, vaginal bleeding, vaginal discharge and vaginal pain.   Musculoskeletal: Negative.    Allergic/Immunologic: Negative.    Neurological: Negative.    Hematological: Negative.    Psychiatric/Behavioral: Negative.           Current Outpatient Medications:     apixaban (ELIQUIS) 5 MG TABS tablet, Take 1 tablet by mouth 2 times daily, Disp: 180 tablet, Rfl: 0    furosemide (LASIX) 40 MG tablet, Take 1 tablet by mouth daily, Disp: 90 tablet, Rfl: 0    spironolactone (ALDACTONE) 25 MG tablet, Take 1 tablet by mouth daily, Disp: 90 tablet, Rfl: 0    metoprolol succinate (TOPROL XL) 50 MG extended release tablet, Take 1 tablet by mouth daily, Disp: 90 tablet, Rfl: 0    gabapentin (NEURONTIN) 300 MG capsule, Take 1 capsule by mouth nightly for 180 days., Disp: 90 capsule, Rfl: 1    ramelteon (ROZEREM) 8 MG tablet, Take 1 tablet by mouth nightly, Disp: 90 tablet, Rfl: 1    amLODIPine (NORVASC) 5 MG tablet, Take 1 tablet by mouth daily, Disp: 90 tablet,

## 2024-12-17 NOTE — ASSESSMENT & PLAN NOTE
Chronic, at goal (stable), continue current treatment plan    Orders:    CBC with Auto Differential; Future    Comprehensive Metabolic Panel, Fasting; Future    Lipid Panel; Future

## 2025-01-09 ENCOUNTER — OFFICE VISIT (OUTPATIENT)
Dept: PRIMARY CARE CLINIC | Age: 89
End: 2025-01-09
Payer: MEDICARE

## 2025-01-09 VITALS
OXYGEN SATURATION: 97 % | HEIGHT: 63 IN | DIASTOLIC BLOOD PRESSURE: 60 MMHG | HEART RATE: 73 BPM | TEMPERATURE: 97.5 F | WEIGHT: 147 LBS | SYSTOLIC BLOOD PRESSURE: 100 MMHG | BODY MASS INDEX: 26.05 KG/M2

## 2025-01-09 DIAGNOSIS — E03.9 ACQUIRED HYPOTHYROIDISM: ICD-10-CM

## 2025-01-09 DIAGNOSIS — I10 ESSENTIAL HYPERTENSION: Primary | ICD-10-CM

## 2025-01-09 DIAGNOSIS — N18.31 STAGE 3A CHRONIC KIDNEY DISEASE (HCC): ICD-10-CM

## 2025-01-09 DIAGNOSIS — N32.81 OVERACTIVE BLADDER: ICD-10-CM

## 2025-01-09 DIAGNOSIS — I48.92 ATRIAL FLUTTER WITH RAPID VENTRICULAR RESPONSE (HCC): ICD-10-CM

## 2025-01-09 PROCEDURE — 99214 OFFICE O/P EST MOD 30 MIN: CPT | Performed by: NURSE PRACTITIONER

## 2025-01-09 PROCEDURE — 1159F MED LIST DOCD IN RCRD: CPT | Performed by: NURSE PRACTITIONER

## 2025-01-09 PROCEDURE — 1160F RVW MEDS BY RX/DR IN RCRD: CPT | Performed by: NURSE PRACTITIONER

## 2025-01-09 PROCEDURE — 1123F ACP DISCUSS/DSCN MKR DOCD: CPT | Performed by: NURSE PRACTITIONER

## 2025-01-09 SDOH — ECONOMIC STABILITY: FOOD INSECURITY: WITHIN THE PAST 12 MONTHS, THE FOOD YOU BOUGHT JUST DIDN'T LAST AND YOU DIDN'T HAVE MONEY TO GET MORE.: NEVER TRUE

## 2025-01-09 SDOH — ECONOMIC STABILITY: FOOD INSECURITY: WITHIN THE PAST 12 MONTHS, YOU WORRIED THAT YOUR FOOD WOULD RUN OUT BEFORE YOU GOT MONEY TO BUY MORE.: NEVER TRUE

## 2025-01-09 ASSESSMENT — ENCOUNTER SYMPTOMS
GASTROINTESTINAL NEGATIVE: 1
WHEEZING: 0
CHEST TIGHTNESS: 0
EYE DISCHARGE: 0
CHOKING: 0
EYE REDNESS: 0
EYE ITCHING: 0
SHORTNESS OF BREATH: 0
COUGH: 1
PHOTOPHOBIA: 0
EYE PAIN: 0
APNEA: 0
STRIDOR: 0
ALLERGIC/IMMUNOLOGIC NEGATIVE: 1

## 2025-01-09 ASSESSMENT — PATIENT HEALTH QUESTIONNAIRE - PHQ9
SUM OF ALL RESPONSES TO PHQ QUESTIONS 1-9: 0
SUM OF ALL RESPONSES TO PHQ QUESTIONS 1-9: 0
2. FEELING DOWN, DEPRESSED OR HOPELESS: NOT AT ALL
1. LITTLE INTEREST OR PLEASURE IN DOING THINGS: NOT AT ALL
SUM OF ALL RESPONSES TO PHQ9 QUESTIONS 1 & 2: 0
SUM OF ALL RESPONSES TO PHQ QUESTIONS 1-9: 0
SUM OF ALL RESPONSES TO PHQ QUESTIONS 1-9: 0

## 2025-01-09 NOTE — ASSESSMENT & PLAN NOTE
She does have the benefit of Vesicare but unfortunately has been misplaced so they are going to look for the medication to restart.

## 2025-01-09 NOTE — ASSESSMENT & PLAN NOTE
Chronic, at goal (stable), continue current treatment plan    Orders:    TSH; Future     15-Johnnie-2021

## 2025-01-09 NOTE — ASSESSMENT & PLAN NOTE
Chronic, at goal (stable), continue current treatment plan       Continue to monitor GFR accordingly

## 2025-01-09 NOTE — PROGRESS NOTES
2026  Iesha Meneses : 1932 Sex: female  Age: 89 y.o.    Chief Complaint   Patient presents with    Discuss Medications     Pt needs help on what medications to take .   Would like to change \"heart medication\" because of price.        Here today for follow-up appt.regarding a medication evaluation.  She does Follow with Dr. Pringle and states he does the blood work. She has an upcoming appt 2025.      There is definitely some memory related issues.  She is supposed to take Vesicare but they seem to have misplaced it and cannot get a refill at this time.          Review of Systems   Constitutional: Negative.    HENT:  Positive for hearing loss (uses hearing aids).    Eyes:  Positive for visual disturbance (Macular degeneration). Negative for photophobia, pain, discharge, redness and itching.   Respiratory:  Positive for cough (Chronic Dry Cough for many years). Negative for apnea, choking, chest tightness, shortness of breath, wheezing and stridor.    Cardiovascular: Negative.    Gastrointestinal: Negative.    Endocrine: Negative.    Genitourinary:  Positive for urgency (overactive bladder) and vaginal pain. Negative for decreased urine volume, difficulty urinating, dyspareunia, dysuria, enuresis, flank pain, frequency, genital sores, hematuria, menstrual problem, pelvic pain, vaginal bleeding and vaginal discharge.   Musculoskeletal: Negative.    Allergic/Immunologic: Negative.    Neurological: Negative.    Hematological: Negative.    Psychiatric/Behavioral: Negative.           Current Outpatient Medications:     furosemide (LASIX) 40 MG tablet, Take 1 tablet by mouth daily, Disp: 90 tablet, Rfl: 0    spironolactone (ALDACTONE) 25 MG tablet, Take 1 tablet by mouth daily, Disp: 90 tablet, Rfl: 0    metoprolol succinate (TOPROL XL) 50 MG extended release tablet, Take 1 tablet by mouth daily, Disp: 90 tablet, Rfl: 0    atorvastatin (LIPITOR) 20 MG tablet, Take 1 tablet by mouth every

## 2025-01-10 DIAGNOSIS — I10 ESSENTIAL HYPERTENSION: ICD-10-CM

## 2025-01-10 NOTE — TELEPHONE ENCOUNTER
Pt called for a check on his eliquis. They found a bottle with a partial refill on it but I already called the pharmacy and they will have it ready tomorrow. Name of Medication(s) Requested:  Requested Prescriptions     Pending Prescriptions Disp Refills    amLODIPine (NORVASC) 5 MG tablet 90 tablet 1     Sig: Take 1 tablet by mouth daily       Medication is on current medication list Yes    Dosage and directions were verified? Yes    Quantity verified: 90 day supply     Pharmacy Verified?  Yes    Last Appointment:  10/29/2024    Future appts:  Future Appointments   Date Time Provider Department Center   3/27/2025  2:30 PM Juan Meneses MD [facility] DEP   3/27/2025  3:00 PM Juan Meneses MD [facility] DEP        (If no appt send self scheduling link. .REFILLAPPT)  Scheduling request sent?     [] Yes  [x] No    Does patient need updated?  [] Yes  [x] No

## 2025-01-11 DIAGNOSIS — I10 ESSENTIAL HYPERTENSION: ICD-10-CM

## 2025-01-13 RX ORDER — LOSARTAN POTASSIUM 100 MG/1
100 TABLET ORAL DAILY
Qty: 90 TABLET | Refills: 1 | Status: SHIPPED | OUTPATIENT
Start: 2025-01-13

## 2025-01-13 RX ORDER — AMLODIPINE BESYLATE 5 MG/1
5 TABLET ORAL DAILY
Qty: 90 TABLET | Refills: 1 | Status: SHIPPED | OUTPATIENT
Start: 2025-01-13

## 2025-01-13 NOTE — TELEPHONE ENCOUNTER
Name of Medication(s) Requested:  Requested Prescriptions     Pending Prescriptions Disp Refills    losartan (COZAAR) 100 MG tablet [Pharmacy Med Name: LOSARTAN 100MG TABLETS] 90 tablet 1     Sig: TAKE 1 TABLET BY MOUTH DAILY       Medication is on current medication list Yes    Dosage and directions were verified? Yes    Quantity verified: 90 day supply     Pharmacy Verified?  Yes    Last Appointment:  10/29/2024    Future appts:  Future Appointments   Date Time Provider Department Center   3/27/2025  2:30 PM Juan Meneses MD [facility] DE[facility]   3/27/2025  3:00 PM Juan Meneses MD [facility] DE[facility]        (If no appt send self scheduling link. .REFILLAPPT)  Scheduling request sent?     [] Yes  [x] No    Does patient need updated?  [] Yes  [x] No

## 2025-01-15 DIAGNOSIS — I50.31 ACUTE DIASTOLIC (CONGESTIVE) HEART FAILURE (HCC): ICD-10-CM

## 2025-01-15 DIAGNOSIS — I48.92 ATRIAL FLUTTER WITH RAPID VENTRICULAR RESPONSE (HCC): ICD-10-CM

## 2025-01-15 RX ORDER — METOPROLOL SUCCINATE 50 MG/1
50 TABLET, EXTENDED RELEASE ORAL DAILY
Qty: 90 TABLET | Refills: 1 | Status: SHIPPED | OUTPATIENT
Start: 2025-01-15

## 2025-01-15 NOTE — TELEPHONE ENCOUNTER
Name of Medication(s) Requested:  Requested Prescriptions     Pending Prescriptions Disp Refills    metoprolol succinate (TOPROL XL) 50 MG extended release tablet [Pharmacy Med Name: METOPROLOL ER SUCCINATE 50MG TABS] 90 tablet 0     Sig: TAKE 1 TABLET BY MOUTH DAILY       Medication is on current medication list Yes    Dosage and directions were verified? Yes    Quantity verified: 90 day supply     Pharmacy Verified?  Yes    Last Appointment:  10/29/2024    Future appts:  Future Appointments   Date Time Provider Department Center   3/27/2025  2:30 PM Juan Meneses MD [facility] DEP   3/27/2025  3:00 PM Juan Meneses MD [facility] DEP        (If no appt send self scheduling link. .REFILLAPPT)  Scheduling request sent?     [] Yes  [x] No    Does patient need updated?  [] Yes  [x] No

## 2025-02-14 ENCOUNTER — OFFICE VISIT (OUTPATIENT)
Dept: PRIMARY CARE CLINIC | Age: 89
End: 2025-02-14
Payer: MEDICARE

## 2025-02-14 VITALS
BODY MASS INDEX: 27.21 KG/M2 | HEIGHT: 63 IN | OXYGEN SATURATION: 99 % | SYSTOLIC BLOOD PRESSURE: 122 MMHG | TEMPERATURE: 98.2 F | HEART RATE: 71 BPM | DIASTOLIC BLOOD PRESSURE: 72 MMHG | WEIGHT: 153.6 LBS

## 2025-02-14 DIAGNOSIS — R30.9 URINATION PAIN: ICD-10-CM

## 2025-02-14 DIAGNOSIS — R35.0 FREQUENCY OF URINATION: ICD-10-CM

## 2025-02-14 DIAGNOSIS — N30.01 ACUTE CYSTITIS WITH HEMATURIA: Primary | ICD-10-CM

## 2025-02-14 DIAGNOSIS — R30.0 BURNING WITH URINATION: ICD-10-CM

## 2025-02-14 LAB
BILIRUBIN, POC: NORMAL
BLOOD URINE, POC: NORMAL
CLARITY, POC: NORMAL
COLOR, POC: YELLOW
GLUCOSE URINE, POC: NORMAL MG/DL
KETONES, POC: NORMAL MG/DL
LEUKOCYTE EST, POC: NORMAL
NITRITE, POC: POSITIVE
PH, POC: 6
PROTEIN, POC: NORMAL MG/DL
SPECIFIC GRAVITY, POC: 1.01
UROBILINOGEN, POC: 0.2 MG/DL

## 2025-02-14 PROCEDURE — 99213 OFFICE O/P EST LOW 20 MIN: CPT | Performed by: NURSE PRACTITIONER

## 2025-02-14 PROCEDURE — 81002 URINALYSIS NONAUTO W/O SCOPE: CPT | Performed by: NURSE PRACTITIONER

## 2025-02-14 PROCEDURE — 1123F ACP DISCUSS/DSCN MKR DOCD: CPT | Performed by: NURSE PRACTITIONER

## 2025-02-14 PROCEDURE — 1159F MED LIST DOCD IN RCRD: CPT | Performed by: NURSE PRACTITIONER

## 2025-02-14 RX ORDER — CEPHALEXIN 500 MG/1
500 CAPSULE ORAL 3 TIMES DAILY
Qty: 30 CAPSULE | Refills: 0 | Status: SHIPPED | OUTPATIENT
Start: 2025-02-14 | End: 2025-02-24

## 2025-02-14 NOTE — PROGRESS NOTES
Chief Complaint:   Other (Possible bladder infection, pt stated that she has frequent urination along with burning and pain.)      History of Present Illness   Source of history provided by:  patient.    Iesha Meneses is a 89 y.o. old female who presents to primary care for dysuria, which occured 2 day(s) prior to arrival. Symptoms are associated with frequency.  She has a history of recurrent UTI. Denies gross hematuria.  denies associated flank pain. Denies any fever, chills, vaginal discharge, vaginal bleeding, possibility of pregnancy, vomiting, diarrhea, or lethargy.  No LMP recorded. Patient has had a hysterectomy.  History of Present Illness  The patient presents for evaluation of a suspected urinary tract infection.    She reports dysuria and urinary urgency for 2 days without hematuria, back pain, or fever. She rarely has UTIs, with the last episode occurring some time ago.    MEDICATIONS  - Keflex       Review of Systems   Unless otherwise stated in this report or unable to obtain because of the patient's clinical or mental status as evidenced by the medical record, this patients's positive and negative responses for Review of Systems, constitutional, psych, eyes, ENT, cardiovascular, respiratory, gastrointestinal, neurological, genitourinary, musculoskeletal, integument systems and systems related to the presenting problem are either stated in the preceding or were not pertinent or were negative for the symptoms and/or complaints related to the medical problem.    Past Medical History:  has a past medical history of Abdominal hernia, Anxiety, Arthritis, Bladder cancer (HCC), DDD (degenerative disc disease), cervical, Depression, Diverticulosis, Dysphagia, Hiatal hernia, Hyperlipidemia, Hypertension, Hypothyroidism, Incontinence, Macular degeneration, Malignant neoplasm of urinary bladder (HCC), Neoplasm of uncertain behavior of skin, Pulmonary nodule, Reflex sympathetic dystrophy, Sleep apnea,

## 2025-02-16 LAB
CULTURE: ABNORMAL
SPECIMEN DESCRIPTION: ABNORMAL

## 2025-02-17 LAB
CULTURE: ABNORMAL
CULTURE: ABNORMAL
SPECIMEN DESCRIPTION: ABNORMAL

## 2025-03-25 ENCOUNTER — TELEPHONE (OUTPATIENT)
Dept: PRIMARY CARE CLINIC | Age: 89
End: 2025-03-25

## 2025-03-25 NOTE — TELEPHONE ENCOUNTER
Care Transitions Initial Follow Up Call    Outreach made within 2 business days of discharge: Yes    Patient: Iesha Meneses Patient : 1932   MRN: [id number]  Reason for Admission:  Discharge Date: 3/24/25       Call attempt number 1- left vm for patient 3/25/25      Scheduled appointment with PCP within 7-14 days    Follow Up  Future Appointments   Date Time Provider Department Center   3/27/2025 11:30 AM Juan Meneses MD [facility]       Caterina Jo LPN

## 2025-03-26 ENCOUNTER — TELEPHONE (OUTPATIENT)
Dept: PRIMARY CARE CLINIC | Age: 89
End: 2025-03-26

## 2025-03-26 NOTE — TELEPHONE ENCOUNTER
Care Transitions Initial Follow Up Call    Outreach made within 2 business days of discharge: Yes    Patient: Iesha Meneses Patient : 1932   MRN: [id number]  Reason for Admission:   Discharge Date: 3/24/25       Spoke with: left  for patient to call back for TCM questions       Follow Up  Future Appointments   Date Time Provider Department Center   3/27/2025 11:30 AM Juan Meneses MD [facility] DEP       Caterina Jo LPN

## 2025-03-27 ENCOUNTER — TELEPHONE (OUTPATIENT)
Dept: PRIMARY CARE CLINIC | Age: 89
End: 2025-03-27

## 2025-03-27 NOTE — TELEPHONE ENCOUNTER
No answer for patient, Caterina left voicemail earlier, and we also left voicemail on daughters line to check in on patients since not here.    Called and spoke with Maribell at the [facility], and she said Iesha is still at [facility] they thought possible MI, and she was possibly having her gallbladder removed. She said Homer is probably with her. CELSO

## 2025-03-31 ENCOUNTER — TELEPHONE (OUTPATIENT)
Dept: PRIMARY CARE CLINIC | Age: 89
End: 2025-03-31

## 2025-03-31 NOTE — TELEPHONE ENCOUNTER
Patients  Alexei called in, and said they are still at the [facility] in [place]. He said that she might have to go in for heart surgery, he will call us when he knows more. They will call at some point to get rescheduled. I said just do what you need too, and be with your wife. I also sent him lots of well wishes from our office, for her. CELSO

## 2025-04-02 ENCOUNTER — TELEPHONE (OUTPATIENT)
Dept: PRIMARY CARE CLINIC | Age: 89
End: 2025-04-02

## 2025-04-02 DIAGNOSIS — I50.31 ACUTE DIASTOLIC (CONGESTIVE) HEART FAILURE: ICD-10-CM

## 2025-04-02 RX ORDER — FUROSEMIDE 40 MG/1
40 TABLET ORAL DAILY
Qty: 90 TABLET | Refills: 0 | Status: SHIPPED | OUTPATIENT
Start: 2025-04-02

## 2025-04-02 NOTE — TELEPHONE ENCOUNTER
Care Transitions Initial Follow Up Call    Outreach made within 2 business days of discharge: Yes    Patient: Iesha Meneses Patient : 1932   MRN: [id number]  Reason for Admission: Non Stemi MI  Discharge Date: 25       Spoke with: Holger Augustine    Discharge department/facility: [facility]    TCM Interactive Patient Contact:  Was patient able to fill all prescriptions: Yes  Was patient instructed to bring all medications to the follow-up visit: Yes  Is patient taking all medications as directed in the discharge summary? Yes  Does patient understand their discharge instructions: Yes  Does patient have questions or concerns that need addressed prior to 7-14 day follow up office visit: no    Follow Up  Future Appointments   Date Time Provider Department Center   2025  2:15 PM [facility]       Caterina Jo LPN

## 2025-04-02 NOTE — TELEPHONE ENCOUNTER
Patients adis Díaz called in requesting [facility] HFU, confirmed in hospital for Non Stemi MI, one stent placed, and GB issues where patient has tube placed draining GB. No soon openings, please advise where to place patient. Also, was not asked to include this by son, just heard patients  in background requesting to be seen at same time, FYI. Records in system. (Patients communication form  3/7/25)      Adis Augustine #[phone].    Dr. Meneses,   Please advise.

## 2025-04-02 NOTE — TELEPHONE ENCOUNTER
Both scheduled, but son then requested to see if PCP could refill patients lasix, confirmed patient is completely out, requesting rx be sent to [facility] in [place].

## 2025-04-02 NOTE — TELEPHONE ENCOUNTER
Name of Medication(s) Requested:  Requested Prescriptions     Pending Prescriptions Disp Refills    furosemide (LASIX) 40 MG tablet 90 tablet 0     Sig: Take 1 tablet by mouth daily       Medication is on current medication list Yes    Dosage and directions were verified? Yes    Quantity verified: 90 day supply     Pharmacy Verified?  Yes    Last Appointment:  10/29/2024    Future appts:  Future Appointments   Date Time Provider Department Center   4/8/2025  2:15 PM Juan Meneses MD [facility] DEP        (If no appt send self scheduling link. .REFILLAPPT)  Scheduling request sent?     [] Yes  [x] No    Does patient need updated?  [] Yes  [x] No

## 2025-04-16 ENCOUNTER — TELEPHONE (OUTPATIENT)
Dept: PRIMARY CARE CLINIC | Age: 89
End: 2025-04-16

## 2025-04-16 NOTE — TELEPHONE ENCOUNTER
Pts  called for an appointment with Dr. Meneses. She had to cancel last weeks appointment because she had a heart attack.  Has an appointment at [facility] tomorrow   Please Advise on a day you would like me to double book.     Care Transitions Initial Follow Up Call    Outreach made within 2 business days of discharge: Yes    Patient: Iesha Meneses Patient : 1932   MRN: [id number]  Reason for Admission: heart attack   Discharge Date:         Sp[facility] with: [facility] Interactive Patient Contact:  Was patient able to fill all prescriptions: Yes  Was patient instructed to bring all medications to the follow-up visit: Yes  Is patient taking all medications as directed in the discharge summary? Yes  Does patient understand their discharge instructions: Yes  Does patient have questions or concerns that need addressed prior to 7-14 day follow up office visit: yes - yes    Additional needs identified to be addressed with provider  No needs identified              Scheduled appointment with PCP within 7-14 days    Follow Up  No future appointments.    Enedina Wilson LPN

## 2025-04-18 ENCOUNTER — TELEPHONE (OUTPATIENT)
Dept: PRIMARY CARE CLINIC | Age: 89
End: 2025-04-18

## 2025-04-18 NOTE — TELEPHONE ENCOUNTER
Maribell from [facility] called . They suggested the doctor talk to both pt's about it being time for them to not be living alone. They need help. [phone]  They did help them with the spilled medications.

## 2025-04-18 NOTE — TELEPHONE ENCOUNTER
Spoke to Homer and he was very frustrated that nobody would help him. I advised again to call the son. His communication form at the [place] office has  so we can not call them.   Called [facility]  was transferred 3 times. Left the last one a Voicemail to call the pt for help sorting the medication, or to call me back. Pt's phone number and the office number was left on the VM.

## 2025-04-18 NOTE — TELEPHONE ENCOUNTER
Pt's  called because Eloisa has spilled her pills and homer can't see well enough to know which is what and he is not sure when to give them. I explained that the dosage and names will be on his discharge papers but he is unsure of the pills because eloisa has spilled them. Homer sounded very anxious and overwhelmed.     Please advise.

## 2025-04-22 ENCOUNTER — OFFICE VISIT (OUTPATIENT)
Dept: PRIMARY CARE CLINIC | Age: 89
End: 2025-04-22
Payer: MEDICARE

## 2025-04-22 VITALS
OXYGEN SATURATION: 98 % | HEIGHT: 63 IN | SYSTOLIC BLOOD PRESSURE: 118 MMHG | WEIGHT: 147.6 LBS | TEMPERATURE: 98.4 F | DIASTOLIC BLOOD PRESSURE: 70 MMHG | BODY MASS INDEX: 26.15 KG/M2 | HEART RATE: 71 BPM

## 2025-04-22 DIAGNOSIS — I48.92 ATRIAL FLUTTER WITH RAPID VENTRICULAR RESPONSE (HCC): ICD-10-CM

## 2025-04-22 DIAGNOSIS — K81.0 ACUTE CHOLECYSTITIS: ICD-10-CM

## 2025-04-22 DIAGNOSIS — N32.81 OVERACTIVE BLADDER: ICD-10-CM

## 2025-04-22 DIAGNOSIS — Z98.890 HISTORY OF INSERTION OF CHOLECYSTOSTOMY TUBE: ICD-10-CM

## 2025-04-22 DIAGNOSIS — Z09 HOSPITAL DISCHARGE FOLLOW-UP: Primary | ICD-10-CM

## 2025-04-22 DIAGNOSIS — I21.4 NSTEMI (NON-ST ELEVATED MYOCARDIAL INFARCTION) (HCC): ICD-10-CM

## 2025-04-22 PROCEDURE — 1123F ACP DISCUSS/DSCN MKR DOCD: CPT | Performed by: STUDENT IN AN ORGANIZED HEALTH CARE EDUCATION/TRAINING PROGRAM

## 2025-04-22 PROCEDURE — 1160F RVW MEDS BY RX/DR IN RCRD: CPT | Performed by: STUDENT IN AN ORGANIZED HEALTH CARE EDUCATION/TRAINING PROGRAM

## 2025-04-22 PROCEDURE — 99215 OFFICE O/P EST HI 40 MIN: CPT | Performed by: STUDENT IN AN ORGANIZED HEALTH CARE EDUCATION/TRAINING PROGRAM

## 2025-04-22 PROCEDURE — 1111F DSCHRG MED/CURRENT MED MERGE: CPT | Performed by: STUDENT IN AN ORGANIZED HEALTH CARE EDUCATION/TRAINING PROGRAM

## 2025-04-22 PROCEDURE — G2211 COMPLEX E/M VISIT ADD ON: HCPCS | Performed by: STUDENT IN AN ORGANIZED HEALTH CARE EDUCATION/TRAINING PROGRAM

## 2025-04-22 PROCEDURE — 1159F MED LIST DOCD IN RCRD: CPT | Performed by: STUDENT IN AN ORGANIZED HEALTH CARE EDUCATION/TRAINING PROGRAM

## 2025-04-22 RX ORDER — VIBEGRON 75 MG/1
75 TABLET, FILM COATED ORAL DAILY
Qty: 90 TABLET | Refills: 0 | Status: SHIPPED | OUTPATIENT
Start: 2025-04-22

## 2025-04-22 RX ORDER — LANOLIN ALCOHOL/MO/W.PET/CERES
400 CREAM (GRAM) TOPICAL DAILY
COMMUNITY

## 2025-04-22 RX ORDER — OXYBUTYNIN CHLORIDE 5 MG/1
5 TABLET, EXTENDED RELEASE ORAL DAILY
COMMUNITY
End: 2025-04-22

## 2025-04-22 RX ORDER — CLOPIDOGREL BISULFATE 75 MG/1
75 TABLET ORAL DAILY
COMMUNITY
Start: 2025-04-02 | End: 2025-05-02

## 2025-04-22 RX ORDER — ATORVASTATIN CALCIUM 80 MG/1
80 TABLET, FILM COATED ORAL NIGHTLY
COMMUNITY
Start: 2025-04-01

## 2025-04-22 NOTE — PROGRESS NOTES
Post-Discharge Transitional Care Management Progress Note      Iesha Meneses   YOB: 1932    Date of Office Visit:  4/22/2025  Date of Hospital Admission: 3/23/2025  Date of Hospital Discharge: 4/1/2025  Date of SNF Discharge: 4/14/2025    Care management risk score Rising risk (score 2-5) and Complex Care (Scores >=6): No Risk Score On File     Non face to face  following discharge, date last encounter closed (first attempt may have been earlier): 04/16/2025 04/16/2025    Call initiated 2 business days of discharge: Yes    ASSESSMENT/PLAN:   Hospital discharge follow-up  -     AL DISCHARGE MEDS RECONCILED W/ CURRENT OUTPATIENT MED LIST  -     Amb External Referral To Home Health  NSTEMI (non-ST elevated myocardial infarction) (HCC)  -     Amb External Referral To Home Health  Acute cholecystitis  -     Amb External Referral To Home Health  History of insertion of cholecystostomy tube  -     Amb External Referral To Home Health  Atrial flutter with rapid ventricular response (HCC)  -     Amb External Referral To Home Health  Overactive bladder  -     GEMTESA 75 MG TABS tablet; Take 1 tablet by mouth daily, Disp-90 tablet, R-0, DAWNormal    Patient admitted with NSTEMI and acute cholecystitis requiring stent to mid-LAD cholecystostomy tube. Reordered HH nurse. Patient and  decline assisted living despite their confusion/concerns about their medication. Thoroughly reviewed med list with patient and  given  helps her with this due to patient's poor eyesight.    Changed oxybutynin back to Gemtesa per previous urology orders. Appears was started on oxybutynin at SNF as they did not have Gemtesa on her hospital/SNF med list.     Follow up with cardiology and urology.    Return in about 6 weeks (around 6/3/2025).      On this date 4/22/2025 I have spent 45 minutes reviewing previous notes, test results and face to face with the patient discussing the diagnosis and importance of

## 2025-04-23 ENCOUNTER — TELEPHONE (OUTPATIENT)
Dept: PRIMARY CARE CLINIC | Age: 89
End: 2025-04-23

## 2025-04-23 NOTE — TELEPHONE ENCOUNTER
Jaun Hubbard Young patients daughter called and left a vm stating that her parents informed that they were told yesterday at the appointment they were doing there medications wrong.   Before I call back I just wanted to get with you to see what you had discussed in the appointment.     Also I will inform Haydee that I can not give her any information because the communication form is .

## 2025-04-23 NOTE — TELEPHONE ENCOUNTER
Appears Jerson didn't have her full med list when admitted- on Vesicare and Gemtesa from urology but not on admission list. Vesicare was started and then somehow oxybutynin started (possibly at [facility]). But supposed to be on Vesicare and Gemtesa per urology not oxybutynin.

## 2025-04-23 NOTE — TELEPHONE ENCOUNTER
Left  for kaycee galo I did receive her message but unfortunately because communication form  I could not discuss anything with her.

## 2025-04-24 ENCOUNTER — TELEPHONE (OUTPATIENT)
Dept: PRIMARY CARE CLINIC | Age: 89
End: 2025-04-24

## 2025-04-24 NOTE — TELEPHONE ENCOUNTER
Would be helpful if he could bring in bottles for me to compare- preservision, occuvite and multivitamin.

## 2025-04-24 NOTE — TELEPHONE ENCOUNTER
Reviewed vitamins- Occuvite and Preservision contain same ingredients. Will need to pick one to continue. Patient's  notified.

## 2025-04-24 NOTE — TELEPHONE ENCOUNTER
Pts  notified, he will bring medication in, he said he might stop today around 130 is that okay for you ?

## 2025-04-24 NOTE — TELEPHONE ENCOUNTER
Pts  calling with a question about vitamins she has been taking PreserVision and also a multi vitamin when he was picking up prescriptions at the pharmacy the pharmacist said that PreserVision has a lot of vitmains in it so she may be getting to much and suggested OccuVite instead so he bought that and when he got home his wife said the eye doctor said to take PreserVision so he was calling to get your opinion.    When message has been addressed, please route message to office pool not to original sender.

## 2025-04-25 ENCOUNTER — TELEPHONE (OUTPATIENT)
Dept: PRIMARY CARE CLINIC | Age: 89
End: 2025-04-25

## 2025-04-25 NOTE — TELEPHONE ENCOUNTER
Pt  called to go over the med list again. I asked if I could call the nurse where they live to go over again but he stated \"that was not a good experience, I don't want her to come \". Tried to talk him through them again.

## 2025-05-05 DIAGNOSIS — I48.92 ATRIAL FLUTTER WITH RAPID VENTRICULAR RESPONSE (HCC): ICD-10-CM

## 2025-05-05 NOTE — TELEPHONE ENCOUNTER
Name of Medication(s) Requested:  Requested Prescriptions     Pending Prescriptions Disp Refills    apixaban (ELIQUIS) 5 MG TABS tablet 180 tablet 0     Sig: Take 1 tablet by mouth 2 times daily       Medication is on current medication list Yes    Dosage and directions were verified? Yes    Quantity verified: 90 day supply     Pharmacy Verified?  Yes    Last Appointment:  4/22/2025    Future appts:  Future Appointments   Date Time Provider Department Center   6/17/2025 11:00 AM Juan Meneses MD [facility] DEP        (If no appt send self scheduling link. .REFILLAPPT)  Scheduling request sent?     [] Yes  [x] No    Does patient need updated?  [] Yes  [x] No

## 2025-05-08 RX ORDER — CLOPIDOGREL BISULFATE 75 MG/1
75 TABLET ORAL DAILY
Qty: 90 TABLET | Refills: 0 | Status: SHIPPED | OUTPATIENT
Start: 2025-05-08

## 2025-05-08 NOTE — TELEPHONE ENCOUNTER
Name of Medication(s) Requested:  Requested Prescriptions     Pending Prescriptions Disp Refills    clopidogrel (PLAVIX) 75 MG tablet 30 tablet 0     Sig: Take 1 tablet by mouth daily       Medication is on current medication list Yes    Dosage and directions were verified? Yes    Quantity verified: 30 day supply     Pharmacy Verified?  Yes    Last Appointment:  4/22/2025    Future appts:  Future Appointments   Date Time Provider Department Center   6/17/2025 11:00 AM Juan Meneses MD [facility] DEP        (If no appt send self scheduling link. .REFILLAPPT)  Scheduling request sent?     [] Yes  [x] No    Does patient need updated?  [] Yes  [x] No

## 2025-06-03 RX ORDER — ATORVASTATIN CALCIUM 80 MG/1
80 TABLET, FILM COATED ORAL NIGHTLY
Qty: 90 TABLET | Refills: 1 | Status: SHIPPED | OUTPATIENT
Start: 2025-06-03

## 2025-06-03 NOTE — TELEPHONE ENCOUNTER
Name of Medication(s) Requested:  Requested Prescriptions     Pending Prescriptions Disp Refills    atorvastatin (LIPITOR) 80 MG tablet 90 tablet 1     Sig: Take 1 tablet by mouth nightly       Medication is on current medication list Yes    Dosage and directions were verified? Yes    Quantity verified: 90 day supply     Pharmacy Verified?  Yes    Last Appointment:  4/22/2025    Future appts:  Future Appointments   Date Time Provider Department Center   6/17/2025 11:00 AM Juan Meneses MD [facility] DEP        (If no appt send self scheduling link. .REFILLAPPT)  Scheduling request sent?     [] Yes  [x] No    Does patient need updated?  [] Yes  [x] No

## 2025-06-09 ENCOUNTER — TELEPHONE (OUTPATIENT)
Dept: PRIMARY CARE CLINIC | Age: 89
End: 2025-06-09

## 2025-06-09 DIAGNOSIS — I50.22 CHRONIC HFREF (HEART FAILURE WITH REDUCED EJECTION FRACTION) (HCC): Primary | ICD-10-CM

## 2025-06-09 RX ORDER — SPIRONOLACTONE 25 MG/1
25 TABLET ORAL DAILY
Qty: 90 TABLET | Refills: 0 | Status: SHIPPED | OUTPATIENT
Start: 2025-06-09 | End: 2025-06-12 | Stop reason: SDUPTHER

## 2025-06-09 NOTE — TELEPHONE ENCOUNTER
Son called and stated that patient was in [facility] last week and he was going over her med list from her discharge and noticed that she does not have the spironolactone at home and he needs it filled. He said she got that prescribed last year by a doctor at [facility]. I advised that we do not have that on her medication list. He said she has been on it since last year and it is important for her to continue taking it for her heart. He did verify that it is 25 mg and it needs to go to [facility] in [place]. I advised that I would pass that along and ask if you could send that or if you would speak to them about it at her appt next week. He said he didn't think she should go that long without taking it and wants it to be sent.

## 2025-06-09 NOTE — TELEPHONE ENCOUNTER
Appears to have been restarted at some point since last discharge in April to now per last notes at [facility]. Sent to pharmacy.

## 2025-06-12 DIAGNOSIS — I50.22 CHRONIC HFREF (HEART FAILURE WITH REDUCED EJECTION FRACTION) (HCC): ICD-10-CM

## 2025-06-12 RX ORDER — SPIRONOLACTONE 25 MG/1
25 TABLET ORAL DAILY
Qty: 90 TABLET | Refills: 0 | Status: SHIPPED | OUTPATIENT
Start: 2025-06-12

## 2025-06-12 NOTE — TELEPHONE ENCOUNTER
Last Appointment:  4/22/2025  Future Appointments   Date Time Provider Department Center   6/17/2025 11:00 AM Juan Meneses MD [facility] DEP      Son called the spironolactone went to incorrect pharmacy.  Patient will need medication for weekend.    Updated pharmacy and pended medication for your review and signature.    Electronically signed by Tameka Nguyen LPN on 6/12/2025 at 11:14 AM

## 2025-06-17 ENCOUNTER — TELEPHONE (OUTPATIENT)
Dept: PRIMARY CARE CLINIC | Age: 89
End: 2025-06-17

## 2025-06-17 ENCOUNTER — OFFICE VISIT (OUTPATIENT)
Dept: PRIMARY CARE CLINIC | Age: 89
End: 2025-06-17
Payer: MEDICARE

## 2025-06-17 VITALS
HEART RATE: 57 BPM | WEIGHT: 144 LBS | SYSTOLIC BLOOD PRESSURE: 138 MMHG | BODY MASS INDEX: 25.52 KG/M2 | TEMPERATURE: 97.7 F | DIASTOLIC BLOOD PRESSURE: 86 MMHG | OXYGEN SATURATION: 100 % | HEIGHT: 63 IN

## 2025-06-17 DIAGNOSIS — I50.22 CHRONIC HFREF (HEART FAILURE WITH REDUCED EJECTION FRACTION) (HCC): ICD-10-CM

## 2025-06-17 DIAGNOSIS — N18.31 STAGE 3A CHRONIC KIDNEY DISEASE (HCC): ICD-10-CM

## 2025-06-17 DIAGNOSIS — K72.00 SEPSIS WITH ACUTE LIVER FAILURE WITHOUT HEPATIC COMA OR SEPTIC SHOCK, DUE TO UNSPECIFIED ORGANISM (HCC): ICD-10-CM

## 2025-06-17 DIAGNOSIS — R29.898 WEAKNESS OF BOTH LOWER EXTREMITIES: ICD-10-CM

## 2025-06-17 DIAGNOSIS — I25.10 CORONARY ARTERY DISEASE INVOLVING NATIVE CORONARY ARTERY OF NATIVE HEART WITHOUT ANGINA PECTORIS: ICD-10-CM

## 2025-06-17 DIAGNOSIS — Z09 HOSPITAL DISCHARGE FOLLOW-UP: Primary | ICD-10-CM

## 2025-06-17 DIAGNOSIS — I21.4 NSTEMI (NON-ST ELEVATED MYOCARDIAL INFARCTION) (HCC): ICD-10-CM

## 2025-06-17 DIAGNOSIS — A41.9 SEPSIS WITH ACUTE LIVER FAILURE WITHOUT HEPATIC COMA OR SEPTIC SHOCK, DUE TO UNSPECIFIED ORGANISM (HCC): ICD-10-CM

## 2025-06-17 DIAGNOSIS — Z95.5 S/P CORONARY ARTERY STENT PLACEMENT: ICD-10-CM

## 2025-06-17 DIAGNOSIS — I10 ESSENTIAL HYPERTENSION: ICD-10-CM

## 2025-06-17 DIAGNOSIS — K80.42 CHOLEDOCHOLITHIASIS WITH ACUTE CHOLECYSTITIS: ICD-10-CM

## 2025-06-17 DIAGNOSIS — T85.518A CHOLECYSTOSTOMY TUBE DYSFUNCTION, INITIAL ENCOUNTER: ICD-10-CM

## 2025-06-17 DIAGNOSIS — I48.92 ATRIAL FLUTTER WITH RAPID VENTRICULAR RESPONSE (HCC): ICD-10-CM

## 2025-06-17 DIAGNOSIS — R65.20 SEPSIS WITH ACUTE LIVER FAILURE WITHOUT HEPATIC COMA OR SEPTIC SHOCK, DUE TO UNSPECIFIED ORGANISM (HCC): ICD-10-CM

## 2025-06-17 PROCEDURE — 1123F ACP DISCUSS/DSCN MKR DOCD: CPT | Performed by: STUDENT IN AN ORGANIZED HEALTH CARE EDUCATION/TRAINING PROGRAM

## 2025-06-17 PROCEDURE — 99215 OFFICE O/P EST HI 40 MIN: CPT | Performed by: STUDENT IN AN ORGANIZED HEALTH CARE EDUCATION/TRAINING PROGRAM

## 2025-06-17 PROCEDURE — 1160F RVW MEDS BY RX/DR IN RCRD: CPT | Performed by: STUDENT IN AN ORGANIZED HEALTH CARE EDUCATION/TRAINING PROGRAM

## 2025-06-17 PROCEDURE — 1159F MED LIST DOCD IN RCRD: CPT | Performed by: STUDENT IN AN ORGANIZED HEALTH CARE EDUCATION/TRAINING PROGRAM

## 2025-06-17 PROCEDURE — G2211 COMPLEX E/M VISIT ADD ON: HCPCS | Performed by: STUDENT IN AN ORGANIZED HEALTH CARE EDUCATION/TRAINING PROGRAM

## 2025-06-17 RX ORDER — LOSARTAN POTASSIUM 100 MG/1
100 TABLET ORAL DAILY
COMMUNITY
Start: 2025-06-01

## 2025-06-17 RX ORDER — SODIUM CHLORIDE 0.9 % (FLUSH) 0.9 %
2-10 SYRINGE (ML) INJECTION EVERY 12 HOURS
COMMUNITY
Start: 2025-05-31

## 2025-06-17 NOTE — PROGRESS NOTES
Lab draw Left AC 22 x 1 1/4\". Venipuncture x 1  
ordered at time of hospital discharge: Yes    Objective:    /86 (BP Site: Left Upper Arm, Patient Position: Sitting, BP Cuff Size: Medium Adult)   Pulse 57   Temp 97.7 °F (36.5 °C) (Temporal)   Ht 1.6 m (5' 3\")   Wt 65.3 kg (144 lb)   SpO2 100%   BMI 25.51 kg/m²     Physical Exam  Vitals and nursing note reviewed.   Constitutional:       General: She is not in acute distress.     Appearance: Normal appearance. She is overweight. She is not ill-appearing or diaphoretic.   Cardiovascular:      Rate and Rhythm: Normal rate and regular rhythm.      Heart sounds: Normal heart sounds.   Pulmonary:      Effort: Pulmonary effort is normal. No respiratory distress.      Breath sounds: Normal breath sounds.   Abdominal:      General: Bowel sounds are normal. There is no distension.      Palpations: Abdomen is soft.      Tenderness: There is no abdominal tenderness. There is no guarding or rebound.      Comments: Perc juwan tube site normal in appearance. Minimal bilious drainage in bag.   Musculoskeletal:      Right lower leg: No edema.      Left lower leg: No edema.   Skin:     General: Skin is warm and dry.   Neurological:      Mental Status: She is alert and oriented to person, place, and time.   Psychiatric:         Mood and Affect: Mood normal.         Behavior: Behavior normal.       An electronic signature was used to authenticate this note.  --Juan Meneses MD

## 2025-06-17 NOTE — TELEPHONE ENCOUNTER
I left a vm with dr enrique office to get his recommendation on what to do for patient. Patient has juwan tube and experiencing minimal drainage for last 2-3 weeks, only \"dribbles\" since then.     Per dr medeiros exam, abdominal exam was normal, not tender, and cite was ok. Patient just having minimal drainage.    Looking to see if Dr Ramirez would like Dr medeiros to order labs ir if patient needs a new tube??     Waiting on call back

## 2025-06-18 LAB
ALBUMIN: 3.8 G/DL (ref 3.5–5.2)
ALP BLD-CCNC: 130 U/L (ref 35–104)
ALT SERPL-CCNC: 17 U/L (ref 0–35)
ANION GAP SERPL CALCULATED.3IONS-SCNC: 12 MMOL/L (ref 7–16)
AST SERPL-CCNC: 36 U/L (ref 0–35)
BILIRUB SERPL-MCNC: 1.4 MG/DL (ref 0–1.2)
BUN BLDV-MCNC: 16 MG/DL (ref 8–23)
CALCIUM SERPL-MCNC: 9.2 MG/DL (ref 8.8–10.2)
CHLORIDE BLD-SCNC: 101 MMOL/L (ref 98–107)
CO2: 26 MMOL/L (ref 22–29)
CREAT SERPL-MCNC: 1 MG/DL (ref 0.5–1)
GFR, ESTIMATED: 52 ML/MIN/1.73M2
GLUCOSE BLD-MCNC: 100 MG/DL (ref 74–99)
HCT VFR BLD CALC: 40.1 % (ref 34–48)
HEMOGLOBIN: 12.5 G/DL (ref 11.5–15.5)
MCH RBC QN AUTO: 28.5 PG (ref 26–35)
MCHC RBC AUTO-ENTMCNC: 31.2 G/DL (ref 32–34.5)
MCV RBC AUTO: 91.6 FL (ref 80–99.9)
PDW BLD-RTO: 15.5 % (ref 11.5–15)
PLATELET # BLD: 262 K/UL (ref 130–450)
PMV BLD AUTO: 11.6 FL (ref 7–12)
POTASSIUM SERPL-SCNC: 4.9 MMOL/L (ref 3.5–5.1)
RBC # BLD: 4.38 M/UL (ref 3.5–5.5)
SODIUM BLD-SCNC: 140 MMOL/L (ref 136–145)
TOTAL PROTEIN: 7.1 G/DL (ref 6.4–8.3)
WBC # BLD: 6.4 K/UL (ref 4.5–11.5)

## 2025-06-19 ENCOUNTER — RESULTS FOLLOW-UP (OUTPATIENT)
Dept: PRIMARY CARE CLINIC | Age: 89
End: 2025-06-19

## 2025-06-19 NOTE — TELEPHONE ENCOUNTER
Have not received call back. Patient's juwan tube not draining well. Please call Dr. Ramirez's office again.

## 2025-06-20 NOTE — TELEPHONE ENCOUNTER
Mr Gideon called to see if you had talked to Dr Ramirez and told him no one has been able to get ahold of him yet.  He is still concerned because there is no drainage.  I advised him if she develops any symptoms thin the next couple of days that she should go to the ER.  If not he said he will call on Tuesday to see if anyone has been able to reach Dr Ramirez.

## 2025-07-02 ENCOUNTER — TELEPHONE (OUTPATIENT)
Dept: PRIMARY CARE CLINIC | Age: 89
End: 2025-07-02

## 2025-07-02 NOTE — TELEPHONE ENCOUNTER
Home Health referral still pending, waiting on PCP visit notes to be signed, please do so at your earliest convenience.

## 2025-07-03 PROBLEM — I25.10 CORONARY ARTERY DISEASE INVOLVING NATIVE CORONARY ARTERY OF NATIVE HEART WITHOUT ANGINA PECTORIS: Status: ACTIVE | Noted: 2025-07-03

## 2025-07-03 RX ORDER — SOLIFENACIN SUCCINATE 5 MG/1
5 TABLET, FILM COATED ORAL DAILY
Qty: 90 TABLET | Refills: 0 | Status: SHIPPED | OUTPATIENT
Start: 2025-07-03

## 2025-07-03 NOTE — TELEPHONE ENCOUNTER
Name of Medication(s) Requested:  Requested Prescriptions     Pending Prescriptions Disp Refills    solifenacin (VESICARE) 5 MG tablet 90 tablet 1     Sig: Take 1 tablet by mouth daily       Medication is on current medication list Yes    Dosage and directions were verified? Yes    Quantity verified: 90 day supply     Pharmacy Verified?  Yes    Last Appointment:  6/17/2025    Future appts:  Future Appointments   Date Time Provider Department Center   8/21/2025 11:30 AM Juan Meneses MD [facility]   8/21/2025 12:00 PM Juan Meneses MD [facility] DEP        (If no appt send self scheduling link. .REFILLAPPT)  Scheduling request sent?     [] Yes  [x] No    Does patient need updated?  [] Yes  [x] No

## 2025-07-03 NOTE — TELEPHONE ENCOUNTER
CELSO: William from [facility] called and stated that they approved Iesha for home health, however when they called the patient to schedule  Mr Meneses declined any help he stated that he doesn't want anyone coming to the house at this time and he feels Iesha needs to rest.

## 2025-07-25 DIAGNOSIS — I50.31 ACUTE DIASTOLIC (CONGESTIVE) HEART FAILURE (HCC): ICD-10-CM

## 2025-07-25 RX ORDER — FUROSEMIDE 40 MG/1
40 TABLET ORAL DAILY
Qty: 90 TABLET | Refills: 0 | Status: SHIPPED | OUTPATIENT
Start: 2025-07-25

## 2025-07-25 NOTE — TELEPHONE ENCOUNTER
Name of Medication(s) Requested:  Requested Prescriptions     Pending Prescriptions Disp Refills    furosemide (LASIX) 40 MG tablet 90 tablet 1     Sig: Take 1 tablet by mouth daily       Medication is on current medication list Yes    Dosage and directions were verified? Yes    Quantity verified: 90 day supply     Pharmacy Verified?  Yes    Last Appointment:  6/17/2025    Future appts:  Future Appointments   Date Time Provider Department Center   8/21/2025 11:30 AM Juan Meneses MD [facility] DEP   8/21/2025 12:00 PM Juan Meneses MD [facility] DEP        (If no appt send self scheduling link. .REFILLAPPT)  Scheduling request sent?     [] Yes  [] No    Does patient need updated?  [] Yes  [] No

## 2025-08-03 DIAGNOSIS — N32.81 OVERACTIVE BLADDER: ICD-10-CM

## 2025-08-04 RX ORDER — VIBEGRON 75 MG/1
75 TABLET, FILM COATED ORAL DAILY
Qty: 90 TABLET | Refills: 0 | Status: SHIPPED | OUTPATIENT
Start: 2025-08-04

## 2025-08-07 DIAGNOSIS — G25.81 RESTLESS LEG SYNDROME: ICD-10-CM

## 2025-08-07 RX ORDER — GABAPENTIN 300 MG/1
300 CAPSULE ORAL NIGHTLY
Qty: 90 CAPSULE | Refills: 0 | Status: SHIPPED | OUTPATIENT
Start: 2025-08-07 | End: 2025-11-05

## 2025-08-21 ENCOUNTER — OFFICE VISIT (OUTPATIENT)
Dept: PRIMARY CARE CLINIC | Age: 89
End: 2025-08-21
Payer: MEDICARE

## 2025-08-21 VITALS
DIASTOLIC BLOOD PRESSURE: 72 MMHG | TEMPERATURE: 97.9 F | BODY MASS INDEX: 25.12 KG/M2 | WEIGHT: 141.8 LBS | OXYGEN SATURATION: 99 % | HEART RATE: 68 BPM | HEIGHT: 63 IN | SYSTOLIC BLOOD PRESSURE: 134 MMHG

## 2025-08-21 DIAGNOSIS — Z00.00 MEDICARE ANNUAL WELLNESS VISIT, SUBSEQUENT: Primary | ICD-10-CM

## 2025-08-21 PROCEDURE — G0439 PPPS, SUBSEQ VISIT: HCPCS | Performed by: STUDENT IN AN ORGANIZED HEALTH CARE EDUCATION/TRAINING PROGRAM

## 2025-08-21 PROCEDURE — 1160F RVW MEDS BY RX/DR IN RCRD: CPT | Performed by: STUDENT IN AN ORGANIZED HEALTH CARE EDUCATION/TRAINING PROGRAM

## 2025-08-21 PROCEDURE — 1159F MED LIST DOCD IN RCRD: CPT | Performed by: STUDENT IN AN ORGANIZED HEALTH CARE EDUCATION/TRAINING PROGRAM

## 2025-08-21 PROCEDURE — 1123F ACP DISCUSS/DSCN MKR DOCD: CPT | Performed by: STUDENT IN AN ORGANIZED HEALTH CARE EDUCATION/TRAINING PROGRAM

## 2025-08-21 ASSESSMENT — PATIENT HEALTH QUESTIONNAIRE - PHQ9
SUM OF ALL RESPONSES TO PHQ QUESTIONS 1-9: 0
SUM OF ALL RESPONSES TO PHQ QUESTIONS 1-9: 0
2. FEELING DOWN, DEPRESSED OR HOPELESS: NOT AT ALL
1. LITTLE INTEREST OR PLEASURE IN DOING THINGS: NOT AT ALL
SUM OF ALL RESPONSES TO PHQ QUESTIONS 1-9: 0
SUM OF ALL RESPONSES TO PHQ QUESTIONS 1-9: 0